# Patient Record
Sex: FEMALE | Race: OTHER
[De-identification: names, ages, dates, MRNs, and addresses within clinical notes are randomized per-mention and may not be internally consistent; named-entity substitution may affect disease eponyms.]

---

## 2017-03-09 ENCOUNTER — HOSPITAL ENCOUNTER (OUTPATIENT)
Dept: HOSPITAL 47 - RADUSWWP | Age: 27
Discharge: HOME | End: 2017-03-09
Payer: COMMERCIAL

## 2017-03-09 DIAGNOSIS — Z36: Primary | ICD-10-CM

## 2017-03-09 DIAGNOSIS — Z3A.14: ICD-10-CM

## 2017-03-09 LAB
CH: 32.5
CHCM: 35.5
ERYTHROCYTE [DISTWIDTH] IN BLOOD BY AUTOMATED COUNT: 4.06 M/UL (ref 3.8–5.4)
ERYTHROCYTE [DISTWIDTH] IN BLOOD: 12.7 % (ref 11.5–15.5)
GLUCOSE SERPL-MCNC: 79 MG/DL (ref 74–99)
HCT VFR BLD AUTO: 37.3 % (ref 34–46)
HDW: 2.64
HEPATITIS B SURFACE AG INDEX: 0.08
HGB BLD-MCNC: 12.7 GM/DL (ref 11.4–16)
MCH RBC QN AUTO: 31.3 PG (ref 25–35)
MCHC RBC AUTO-ENTMCNC: 34 G/DL (ref 31–37)
MCV RBC AUTO: 92 FL (ref 80–100)
NON-AFRICAN AMERICAN GFR(MDRD): >60
WBC # BLD AUTO: 10.5 K/UL (ref 3.8–10.6)

## 2017-03-09 PROCEDURE — 86900 BLOOD TYPING SEROLOGIC ABO: CPT

## 2017-03-09 PROCEDURE — 82947 ASSAY GLUCOSE BLOOD QUANT: CPT

## 2017-03-09 PROCEDURE — 86901 BLOOD TYPING SEROLOGIC RH(D): CPT

## 2017-03-09 PROCEDURE — 86850 RBC ANTIBODY SCREEN: CPT

## 2017-03-09 PROCEDURE — 76801 OB US < 14 WKS SINGLE FETUS: CPT

## 2017-03-09 PROCEDURE — 86762 RUBELLA ANTIBODY: CPT

## 2017-03-09 PROCEDURE — 86780 TREPONEMA PALLIDUM: CPT

## 2017-03-09 PROCEDURE — 87340 HEPATITIS B SURFACE AG IA: CPT

## 2017-03-09 PROCEDURE — 36415 COLL VENOUS BLD VENIPUNCTURE: CPT

## 2017-03-09 PROCEDURE — 82565 ASSAY OF CREATININE: CPT

## 2017-03-09 PROCEDURE — 85027 COMPLETE CBC AUTOMATED: CPT

## 2017-03-09 NOTE — US
EXAMINATION TYPE: US OB <= 14 wk fetus early second trimester

 

DATE OF EXAM: 3/9/2017 2:30 PM

 

COMPARISON: NONE

 

CLINICAL HISTORY: Z36 Confirm Dates. unknown dates

 

EXAM PERFORMED:  Transabdominal (TA)

 

EXAM MEASUREMENTS:

 

GESTATIONAL AGE / DATING

 

Physician Established: not established

 

Dates by LMP:  (15 weeks/3 days)

** EDC: 08/28/2017

Dates by First Scan:  no prior

Dates by Current Scan for:  (14 weeks/2 days)

** EDC: 09/05/2017

 

MATERNAL ANATOMY 

 

Uterus: 13.0 x 7.9 x 8.8 cm

Right Ovary: 2.6 x 1.4 x 1.9 cm

Left Ovary: 1.8 x 1.6 x 1.6 cm

Post CDS / Adnexa: wnl

Presence of free fluid: no

Presence of corpus luteal cyst: no

Presence of subchorionic bleed: no

BPD:2.7 cm??? 

**14  weeks /5days

FL:1.6 cm???? 

**  14weeks/4days 

 

GESTATION / FETAL SURVEY

 

CRL: 7.6 (13 weeks/5 days)

 

Heart Rate: 157 bpm

Rhythm:  Normal

IUP:  Viable IUP

 

 

Date of LMP: 11/21/2016

 

 

TECHNOLOGIST IMPRESSION:  viable IUP,1st scan for EDC 

 

Single live intrauterine gestation is confirmed as gestational sac and fetal pole are identified. Yol
k sac is not clearly evident. There is no free fluid seen in pelvic cul-de-sac.

 

Both ovaries are seen. No suspicious extraovarian adnexal masses are noted.

 

IMPRESSION:

Single live intrauterine gestation is confirmed, mean crown-rump length is 7.6 cm corresponding to 13
 weeks 5 day old fetus.

## 2017-04-13 ENCOUNTER — HOSPITAL ENCOUNTER (OUTPATIENT)
Dept: HOSPITAL 47 - RADUSWWP | Age: 27
Discharge: HOME | End: 2017-04-13
Payer: COMMERCIAL

## 2017-04-13 DIAGNOSIS — O36.62X0: Primary | ICD-10-CM

## 2017-04-13 DIAGNOSIS — Z3A.19: ICD-10-CM

## 2017-04-13 PROCEDURE — 76811 OB US DETAILED SNGL FETUS: CPT

## 2017-04-14 NOTE — US
EXAMINATION TYPE: US OB fetal anatomy transabd

 

DATE OF EXAM: 4/13/2017 5:03 PM

 

COMPARISON: Previous study dated 3/9/2017.

 

HISTORY: O36.62X0 Large for dates second trimester lga

 

TECHNIQUE:  Transabdominal (TA)

 

EXAM MEASUREMENTS:

 

GESTATIONAL AGE / DATING

Physician Established: (19 weeks/3 days)

** EDC:  9/5/2017

Dates by LMP:  (20 weeks/3 days)

** EDC: 8/28/2017

Dates by First Scan:  (19 weeks/3 days)

** EDC: 9/5/2017

Dates by Current Scan for:  (19 weeks/6 days)

** EDC:8/31/2017

 

FETAL SURVEY

IUP:  Single

PLACENTA: Anterior     

PREVIA: No previa   

** JENIFFER: 15.3 cm Normal

CERVICAL LENGTH (transabdominal: norm > 3.0cm): 3.7 cm

 

FETAL BIOMETRY

PRESENTATION:   Vertex

FETAL LIE:  Oblique

BPD: 4.7 cm

**  20 weeks / 2 days

HC: 17.1 cm

**  19 weeks / 5 days

AC: 14.5 cm

**  19 weeks / 6 days

FL: 3.1 cm

**  19 weeks / 5 days

ESTIMATED FETAL WEIGHT IN GRAMS:  310 grams

ESTIMATED FETAL WEIGHT IN LBS/OZS:  0 lbs. 11 oz. 

WEIGHT PERCENTAGE BASED ON ESTABLISHED DATE:  14 %

** HC/AC:  1.18  Normal 

** FL/AC:  21 Normal

HEART RATE:  139 bpm 

RHYTHM: Normal

 

ANATOMY SEEN (within normal limits): 

* Cisterna Magna (< 1.1 cm) 0.5 cm

* Nuchal Fold (< 0.6 cm) 0.2 cm

* Cerebellum (varies with age) 1.9 cm

Midline Falx 

Cavus Septi Pellucidi   

Four Chamber Heart

Stomach

Diaphragm 

Kidneys (bilateral) 

Bladder

Cord Insert 

Three Vessel Cord 

Longitudinal Spine 

Transverse Spine 

Legs (bilateral)

 

 

ANATOMY NOT SEEN:  

Choroid Plexus (bilateral)

Lateral Vent (< 1 cm)

Outflow tracts:  LVOT/RVOT

Situs

Nose / Lips 

Arms (bilateral)

 

IMPRESSION:  

 

ARREGUIN FETUS PRESENT IN A VERTEX LIE WITH A GESTATIONAL AGE OF 19 WEEKS 6 DAYS +/- 10 DAYS. ESTIMA
SERG DATE OF CONFINEMENT BASED ON THIS EXAMINATION IS 8/31/2017.

 

PLEASE NOTE THAT THE FETAL ANATOMIC EXAM WAS LIMITED.

## 2017-05-24 ENCOUNTER — HOSPITAL ENCOUNTER (OUTPATIENT)
Dept: HOSPITAL 47 - RADUSWWP | Age: 27
Discharge: HOME | End: 2017-05-24
Payer: COMMERCIAL

## 2017-05-24 DIAGNOSIS — K80.20: Primary | ICD-10-CM

## 2017-05-24 LAB
CH: 33.6
CHCM: 34.3
ERYTHROCYTE [DISTWIDTH] IN BLOOD BY AUTOMATED COUNT: 3.36 M/UL (ref 3.8–5.4)
ERYTHROCYTE [DISTWIDTH] IN BLOOD: 13.4 % (ref 11.5–15.5)
HCT VFR BLD AUTO: 33.1 % (ref 34–46)
HDW: 2.7
HGB BLD-MCNC: 11.3 GM/DL (ref 11.4–16)
MCH RBC QN AUTO: 33.5 PG (ref 25–35)
MCHC RBC AUTO-ENTMCNC: 34.1 G/DL (ref 31–37)
MCV RBC AUTO: 98.4 FL (ref 80–100)
WBC # BLD AUTO: 10.1 K/UL (ref 3.8–10.6)

## 2017-05-24 PROCEDURE — 85027 COMPLETE CBC AUTOMATED: CPT

## 2017-05-24 PROCEDURE — 82950 GLUCOSE TEST: CPT

## 2017-05-24 PROCEDURE — 76705 ECHO EXAM OF ABDOMEN: CPT

## 2017-05-24 NOTE — US
EXAMINATION TYPE: US gallbladder

 

DATE OF EXAM: 5/24/2017 8:36 AM

 

COMPARISON: None.

 

CLINICAL HISTORY: RUQ Abd Pain R10.11. Epigastric pain radiating to back

 

EXAM MEASUREMENTS:

 

Liver Length:  13.8 cm   

Gallbladder Wall:  0.1 cm   

CHD:  0.4 cm

Right Kidney:  11.7 x 4.8 x 4.3 cm

 

Pancreas:  echogenic

Liver:  wnl  

Gallbladder:  multiple mobile echogenic foci

**Evidence for sonographic De León's sign:  neg

CHD:  wnl 

Right Kidney:  wnl 

 

Limited views of the pancreas demonstrate echogenicity of the pancreas. This may represent fatty infi
ltration.

 

The liver is normal in size without biliary dilatation.

 

There is evidence of cholelithiasis. Gallbladder wall measures 1 mm. Distal common hepatic duct measu
res 4 mm.

 

The right kidney is normal.

 

The intrahepatic IVC is unremarkable.

 

IMPRESSION: 

 

CHOLELITHIASIS.

## 2017-06-07 ENCOUNTER — HOSPITAL ENCOUNTER (OUTPATIENT)
Dept: HOSPITAL 47 - LABWHC1 | Age: 27
Discharge: HOME | End: 2017-06-07
Payer: COMMERCIAL

## 2017-06-07 DIAGNOSIS — O24.419: Primary | ICD-10-CM

## 2017-06-07 DIAGNOSIS — Z3A.00: ICD-10-CM

## 2017-06-07 LAB
GLUCOSE 3H P GLC SERPL-MCNC: 136 MG/DL
GTT SERPL-IMP: (no result)

## 2017-06-07 PROCEDURE — 82952 GTT-ADDED SAMPLES: CPT

## 2017-06-07 PROCEDURE — 82951 GLUCOSE TOLERANCE TEST (GTT): CPT

## 2017-06-07 PROCEDURE — 36415 COLL VENOUS BLD VENIPUNCTURE: CPT

## 2017-09-06 ENCOUNTER — HOSPITAL ENCOUNTER (INPATIENT)
Dept: HOSPITAL 47 - FBPOP | Age: 27
LOS: 1 days | Discharge: HOME | End: 2017-09-07
Payer: COMMERCIAL

## 2017-09-06 VITALS — BODY MASS INDEX: 25.2 KG/M2

## 2017-09-06 DIAGNOSIS — O48.0: Primary | ICD-10-CM

## 2017-09-06 DIAGNOSIS — Z3A.40: ICD-10-CM

## 2017-09-06 LAB
BASOPHILS # BLD AUTO: 0 K/UL (ref 0–0.2)
BASOPHILS NFR BLD AUTO: 0 %
CH: 33.2
CHCM: 35.6
EOSINOPHIL # BLD AUTO: 0 K/UL (ref 0–0.7)
EOSINOPHIL NFR BLD AUTO: 0 %
ERYTHROCYTE [DISTWIDTH] IN BLOOD BY AUTOMATED COUNT: 3.96 M/UL (ref 3.8–5.4)
ERYTHROCYTE [DISTWIDTH] IN BLOOD: 13.3 % (ref 11.5–15.5)
HCT VFR BLD AUTO: 37.1 % (ref 34–46)
HDW: 2.47
HGB BLD-MCNC: 13.1 GM/DL (ref 11.4–16)
LUC NFR BLD AUTO: 2 %
LYMPHOCYTES # SPEC AUTO: 1.4 K/UL (ref 1–4.8)
LYMPHOCYTES NFR SPEC AUTO: 14 %
MCH RBC QN AUTO: 33.2 PG (ref 25–35)
MCHC RBC AUTO-ENTMCNC: 35.4 G/DL (ref 31–37)
MCV RBC AUTO: 93.7 FL (ref 80–100)
MONOCYTES # BLD AUTO: 0.5 K/UL (ref 0–1)
MONOCYTES NFR BLD AUTO: 5 %
NEUTROPHILS # BLD AUTO: 7.9 K/UL (ref 1.3–7.7)
NEUTROPHILS NFR BLD AUTO: 79 %
WBC # BLD AUTO: 0.17 10*3/UL
WBC # BLD AUTO: 9.9 K/UL (ref 3.8–10.6)
WBC (PEROX): 9.92

## 2017-09-06 PROCEDURE — 88307 TISSUE EXAM BY PATHOLOGIST: CPT

## 2017-09-06 PROCEDURE — 10907ZC DRAINAGE OF AMNIOTIC FLUID, THERAPEUTIC FROM PRODUCTS OF CONCEPTION, VIA NATURAL OR ARTIFICIAL OPENING: ICD-10-PCS

## 2017-09-06 PROCEDURE — 99213 OFFICE O/P EST LOW 20 MIN: CPT

## 2017-09-06 PROCEDURE — 0KQM0ZZ REPAIR PERINEUM MUSCLE, OPEN APPROACH: ICD-10-PCS

## 2017-09-06 PROCEDURE — 85025 COMPLETE CBC W/AUTO DIFF WBC: CPT

## 2017-09-06 PROCEDURE — 59025 FETAL NON-STRESS TEST: CPT

## 2017-09-06 RX ADMIN — DOCUSATE SODIUM AND SENNOSIDES SCH: 50; 8.6 TABLET ORAL at 21:21

## 2017-09-06 NOTE — P.MSEPDOC
Presenting Problems





- Arrival Data


Date of Arrival on Unit: 17


Time of Arrival on Unit: 08:34


Mode of Transport: Ambulatory





- Complaint


OB-Reason for Admission/Chief Complaint: Possible Onset of Labor





Prenatal Medical History





- Pregnancy Information


: 1


Para: 0


Term: 0


: 0


Abortions: Spontaneous or Elective: 0


Number of Living Children: 0





- Gestational Age


Expected Date of Delivery: 17


Gestational Age by THU (wks/days): 39 Weeks and 6 Days





Review of Systems





- Review of Systems


Constitutional: No problems


Breast: No problems


ENT: No problems


Cardiovascular: No problems


Respiratory: No problems


Gastrointestinal: No problems


Genitourinary: No problems


Musculoskeletal: No problems


Neurological: No problems


Skin: No problems





Vital Signs





- Temperature


Temperature: 96.6 F


Temperature Source: Temporal Artery Scan





- Pulse


  ** Right Sitting Brachial


Pulse Rate: 75


Pulse Assessment Method: Palpation





- Respirations


Respiratory Rate: 20


Oxygen Delivery Method: Room Air





- Blood Pressure


  ** Right Arm Supine


Blood Pressure: 135/86


Blood Pressure Mean: 102


Blood Pressure Source: Automatic Cuff





Medical Screen Scoring (Pre)





- Cervical Exam


Dilation: 1-3 cm = 1


Effacement: More than 50% = 2


Membranes: Intact





- Uterine Contractions


Frequency: > 5 minutes apart = 1





- Maternal Vital Signs


Maternal Temperature: N/A


Maternal Blood Pressure: N/A


Signs of Preeclampsia: N/A


Maternal Respirations: N/A





- Maternal Trauma


Maternal Trauma: N/A





- Fetal Assessment


Baseline FHR: 125


Fetal Heart Rate - NICHD Category: Category I (Normal) = 0


NST: Reactive





- Total Score


Total Score (Pre): 4





- Level of Risk


Level of Risk: Low (0-5)





Physician Notification (Pre)





- Physician Notified


Physician Notified Date: 17


Physician Notified Time: 09:15


Physician/Practitioner Notifed:: Yeison


Spoke With: Yeison


New Order Received: Yes





- Notification Comment


Comment: Admit for labor





Disposition





- Disposition


OB Disposition: Admit


Transferred to:: [address]


I agree with the RN Medical Screening Exam: Yes


Risk & Benefit of care provided described in d/c instruction: Yes


Diagnosis: ENCOUNTER FOR FULL-TERM UNCOMPLICATED DELIVERY

## 2017-09-06 NOTE — P.HPOB
History of Present Illness


H&P Date: 17


Chief Complaint: normal labor





27 year old  presents at 40 weeks 1 day in labor. HEr cervix is 3-4/90/-1 

and she is roz every 2-7 minutes. fetal heart tones 135-140 with 

moderate variability and reactive. 





Review of Systems


All systems: negative


Constitutional: Denies chills, Denies fever


Eyes: denies blurred vision, denies pain


Ears, nose, mouth and throat: Denies headache, Denies sore throat


Cardiovascular: Denies chest pain, Denies shortness of breath


Respiratory: Denies cough


Gastrointestinal: Denies abdominal pain, Denies diarrhea, Denies nausea, Denies 

vomiting


Genitourinary: Denies dysuria, Denies hematuria


Musculoskeletal: Denies myalgias


Integumentary: Denies pruritus, Denies rash


Neurological: Denies numbness, Denies weakness


Psychiatric: Denies anxiety, Denies depression


Endocrine: Denies fatigue, Denies weight change





Past Medical History


Past Medical History: No Reported History


Additional Past Medical History / Comment(s): Obstetric history: This is her 

first pregnancy and she had prenatal care with me since 13 weeks. A+, abs neg, 

Rub Imm, Treponemal ab neg, Hep B neg. abnormal 1hr, normal 3hr.GBS neg.


History of Any Multi-Drug Resistant Organisms: None Reported


Past Surgical History: No Surgical Hx Reported


Past Anesthesia/Blood Transfusion Reactions: No Reported Reaction


Smoking Status: Never smoker


Past Alcohol Use History: None Reported


Past Drug Use History: None Reported





- Past Family History


  ** Mother


Family Medical History: No Reported History





Medications and Allergies


 Home Medications











 Medication  Instructions  Recorded  Confirmed  Type


 


Pnv,Calcium 72/Iron/Folic Acid 1 tab PO DAILY 17 History





[Prenatal Plus Tablet]    











 Allergies











Allergy/AdvReac Type Severity Reaction Status Date / Time


 


No Known Allergies Allergy   Verified 17 08:40














Exam


Osteopathic Statement: *.  No significant issues noted on an osteopathic 

structural exam other than those noted in the History and Physical/Consult.





- Vital Signs


Vital signs: 


 Vital Signs











  Temp Pulse Resp BP


 


 17 09:42  96.6 F L  75  20  135/86








 Intake and Output











 17





 22:59 06:59 14:59


 


Other:   


 


  Weight   68.946 kg








 Patient Weight











 17





 06:59


 


Weight 68.946 kg














Heart: RRR


Lungs: CTAB


Abdomen: soft, nontender


Extremeties: neg du's





Results


Result Diagrams: 


 17 09:40





 Abnormal Lab Results - Last 24 Hours (Table)











  17 Range/Units





  09:40 


 


Neutrophils #  7.9 H  (1.3-7.7)  k/uL














Assessment and Plan


(1) Normal labor


Status: Acute   


Plan: 





1. admit to family birth


2. anticipate normal vaginal delivery

## 2017-09-07 VITALS
TEMPERATURE: 98.1 F | HEART RATE: 87 BPM | SYSTOLIC BLOOD PRESSURE: 114 MMHG | DIASTOLIC BLOOD PRESSURE: 62 MMHG | RESPIRATION RATE: 16 BRPM

## 2017-09-07 RX ADMIN — DOCUSATE SODIUM AND SENNOSIDES SCH EACH: 50; 8.6 TABLET ORAL at 08:23

## 2017-09-07 NOTE — P.PROBDLV
Vaginal Delivery Note





- .


Vaginal Delivery Note: 





27-year-old  presents at 40 weeks and 1 day in active labor.  Her cervix 

is 3-4 centers dilated, 90% effaced, -2 station.  She is roz every 2-7 

minutes.  Fetal heart tones are 140-145 with moderate variability and reactive.

  Pitocin augmentation was started.  Amniotomy performed at 1207, clear fluid 

noted.  Her cervix was completely dilated at 1329.  She pushed, delivered a 

viable female infant over intact perineum at 1342.  Head delivered OA, anterior 

shoulder delivered gentle downward traction followed by posterior shoulder and 

rest of body.  Nose and mouth bulb suctioned, cord clamped and cut, infant 

placed on mother's abdomen.  Apgars 9, 9, weight 7 lbs. 4 oz.  Placenta 

delivered spontaneously, intact with three-vessel cord at 1345.  Vagina, cervix

, perineum inspected.  Second-degree midline laceration repaired with 3-0 

Vicryl.  Estimated blood loss 250 mL.  Mother and baby in stable condition.

## 2017-09-07 NOTE — P.DS
Providers


Date of admission: 


09/06/17 09:30





Expected date of discharge: 09/07/17


Attending physician: 


Latrice Latham








- Discharge Diagnosis(es)


(1) Normal labor


Current Visit: Yes   Status: Resolved   





(2) Normal vaginal delivery


Current Visit: Yes   Status: Acute   


Hospital Course: 





Patient presented and in active labor.  She underwent a normal vaginal 

delivery.  She had an uncomplicated postpartum course.  She'll be discharged 

home postpartum day #1 in stable condition to follow-up with me in 6 weeks.





Plan - Discharge Summary


New Discharge Prescriptions: 


New


   Ibuprofen [Motrin] 600 mg PO Q6HR PRN #30 tab


     PRN Reason: Mild Pain Or Fever >= 100.5





No Action


   Pnv,Calcium 72/Iron/Folic Acid [Prenatal Plus Tablet] 1 tab PO DAILY


Discharge Medication List





Pnv,Calcium 72/Iron/Folic Acid [Prenatal Plus Tablet] 1 tab PO DAILY 09/06/17 [

History]


Ibuprofen [Motrin] 600 mg PO Q6HR PRN #30 tab 09/07/17 [Rx]








Follow up Appointment(s)/Referral(s): 


Latrice Latham DO [Doctor of Osteopathic Medicine] - 6 Weeks


Discharge Disposition: HOME SELF-CARE

## 2017-09-24 ENCOUNTER — HOSPITAL ENCOUNTER (INPATIENT)
Dept: HOSPITAL 47 - EC | Age: 27
LOS: 4 days | Discharge: HOME | DRG: 769 | End: 2017-09-28
Payer: COMMERCIAL

## 2017-09-24 VITALS — BODY MASS INDEX: 21.4 KG/M2

## 2017-09-24 DIAGNOSIS — Z82.49: ICD-10-CM

## 2017-09-24 DIAGNOSIS — K85.10: ICD-10-CM

## 2017-09-24 DIAGNOSIS — Z83.3: ICD-10-CM

## 2017-09-24 LAB
ALP SERPL-CCNC: 108 U/L (ref 38–126)
ALT SERPL-CCNC: 77 U/L (ref 9–52)
AMYLASE SERPL-CCNC: 155 U/L (ref 30–110)
ANION GAP SERPL CALC-SCNC: 11 MMOL/L
AST SERPL-CCNC: 116 U/L (ref 14–36)
BASOPHILS # BLD AUTO: 0 K/UL (ref 0–0.2)
BASOPHILS NFR BLD AUTO: 0 %
BUN SERPL-SCNC: 9 MG/DL (ref 7–17)
CALCIUM SPEC-MCNC: 9.4 MG/DL (ref 8.4–10.2)
CH: 34.1
CHCM: 35.3
CHLORIDE SERPL-SCNC: 106 MMOL/L (ref 98–107)
CO2 SERPL-SCNC: 25 MMOL/L (ref 22–30)
EOSINOPHIL # BLD AUTO: 0.1 K/UL (ref 0–0.7)
EOSINOPHIL NFR BLD AUTO: 1 %
ERYTHROCYTE [DISTWIDTH] IN BLOOD BY AUTOMATED COUNT: 3.95 M/UL (ref 3.8–5.4)
ERYTHROCYTE [DISTWIDTH] IN BLOOD: 14.1 % (ref 11.5–15.5)
GLUCOSE SERPL-MCNC: 98 MG/DL (ref 74–99)
HCT VFR BLD AUTO: 38.4 % (ref 34–46)
HDW: 2.98
HGB BLD-MCNC: 12.8 GM/DL (ref 11.4–16)
LUC NFR BLD AUTO: 1 %
LYMPHOCYTES # SPEC AUTO: 0.9 K/UL (ref 1–4.8)
LYMPHOCYTES NFR SPEC AUTO: 9 %
MCH RBC QN AUTO: 32.5 PG (ref 25–35)
MCHC RBC AUTO-ENTMCNC: 33.4 G/DL (ref 31–37)
MCV RBC AUTO: 97.2 FL (ref 80–100)
MONOCYTES # BLD AUTO: 0.3 K/UL (ref 0–1)
MONOCYTES NFR BLD AUTO: 4 %
NEUTROPHILS # BLD AUTO: 8.1 K/UL (ref 1.3–7.7)
NEUTROPHILS NFR BLD AUTO: 86 %
NON-AFRICAN AMERICAN GFR(MDRD): >60
PARTICLE COUNT: 2859
PH UR: 6 [PH] (ref 5–8)
POTASSIUM SERPL-SCNC: 3.9 MMOL/L (ref 3.5–5.1)
PROT SERPL-MCNC: 7.1 G/DL (ref 6.3–8.2)
RBC UR QL: 8 /HPF (ref 0–5)
SODIUM SERPL-SCNC: 142 MMOL/L (ref 137–145)
SP GR UR: 1.02 (ref 1–1.03)
SQUAMOUS UR QL AUTO: <1 /HPF (ref 0–4)
UA BILLING (MACRO VS. MICRO): (no result)
UROBILINOGEN UR QL STRIP: 3 MG/DL (ref ?–2)
WBC # BLD AUTO: 0.08 10*3/UL
WBC # BLD AUTO: 9.5 K/UL (ref 3.8–10.6)
WBC #/AREA URNS HPF: 23 /HPF (ref 0–5)
WBC (PEROX): 10

## 2017-09-24 PROCEDURE — 96360 HYDRATION IV INFUSION INIT: CPT

## 2017-09-24 PROCEDURE — 81001 URINALYSIS AUTO W/SCOPE: CPT

## 2017-09-24 PROCEDURE — 74000: CPT

## 2017-09-24 PROCEDURE — 88304 TISSUE EXAM BY PATHOLOGIST: CPT

## 2017-09-24 PROCEDURE — 80053 COMPREHEN METABOLIC PANEL: CPT

## 2017-09-24 PROCEDURE — 76705 ECHO EXAM OF ABDOMEN: CPT

## 2017-09-24 PROCEDURE — 81025 URINE PREGNANCY TEST: CPT

## 2017-09-24 PROCEDURE — 87040 BLOOD CULTURE FOR BACTERIA: CPT

## 2017-09-24 PROCEDURE — 83690 ASSAY OF LIPASE: CPT

## 2017-09-24 PROCEDURE — 87086 URINE CULTURE/COLONY COUNT: CPT

## 2017-09-24 PROCEDURE — 82150 ASSAY OF AMYLASE: CPT

## 2017-09-24 PROCEDURE — 85025 COMPLETE CBC W/AUTO DIFF WBC: CPT

## 2017-09-24 PROCEDURE — 80061 LIPID PANEL: CPT

## 2017-09-24 PROCEDURE — 36415 COLL VENOUS BLD VENIPUNCTURE: CPT

## 2017-09-24 PROCEDURE — 99285 EMERGENCY DEPT VISIT HI MDM: CPT

## 2017-09-24 NOTE — US
EXAMINATION TYPE: US abdomen limited

 

DATE OF EXAM: 9/24/2017

 

COMPARISON: US

 

CLINICAL HISTORY: Pain.

 

EXAM MEASUREMENTS:

 

Liver Length:  12.8 cm   

Gallbladder Wall:  0.3 cm   

CBD:  0.4 cm

Right Kidney:  12.2 x 4.8 x 4.0 cm

 

 

 

Pancreas:  partially obscured by bowel gas

Liver:  wnl  

Gallbladder:cholethiasis

**Evidence for sonographic De León's sign:  No

CBD:  wnl 

Right Kidney:  wnl 

 

 

 

IMPRESSION: Numerous gallstones. No dilated ducts. No focal liver defect.

## 2017-09-24 NOTE — XR
EXAMINATION TYPE: XR KUB

 

DATE OF EXAM: 9/24/2017

 

COMPARISON: NONE

 

HISTORY: Abdominal pain

 

TECHNIQUE: 2 views

 

FINDINGS: There is no sign of intestinal obstruction or pneumoperitoneum. Fecal pattern is normal. Th
ere are no pathologic calcifications. Lung bases are clear. There is no sign of a mass. Bony structur
es appear normal.

 

IMPRESSION: Nonacute abdomen.

## 2017-09-24 NOTE — ED
Abdominal Pain HPI





<Ray Clifton - Last Filed: 09/24/17 18:48>





- General


Source: patient, RN notes reviewed


Mode of arrival: ambulatory


Limitations: no limitations





<Edd Cummings - Last Filed: 09/24/17 18:51>





- General


Chief Complaint: Abdominal Pain


Stated Complaint: Abd Pain


Time Seen by Provider: 09/24/17 15:44





- History of Present Illness


Initial Comments: 





this a 27-year-old female presents emergency Department with chief complaint of 

abdominal pain.  Patient states the pain sstarted this morning.  Patient states 

she has right upper to mid abdominal pain states radiates to her shoulder.  

Patient states she's been diagnosed with gallstones in the past.  She states 

that she is 2 weeks postpartum.  Denies any increased vaginal bleeding or 

vaginal discharge.  Denies any dysuria or hematuria.  She states that she has 

some nausea no vomiting.  She did have heartburn throughout her pregnancy but 

states that has improved since delivering. (Edd Cummings)





- Related Data


 Home Medications











 Medication  Instructions  Recorded  Confirmed


 


Prenatal Vit-Iron-Folic Acid 1 cap PO HS 09/24/17 09/24/17





[Prenatal-U Capsule (formulary)]   











 Allergies











Allergy/AdvReac Type Severity Reaction Status Date / Time


 


No Known Allergies Allergy   Verified 09/24/17 16:07














Review of Systems


ROS Other: All systems not noted in ROS Statement are negative.





<Ray Clifton - Last Filed: 09/24/17 18:48>


ROS Other: All systems not noted in ROS Statement are negative.





<Edd Cummings - Last Filed: 09/24/17 18:51>


ROS Statement: 


Those systems with pertinent positive or pertinent negative responses have been 

documented in the HPI.








Past Medical History


Past Medical History: No Reported History


Additional Past Medical History / Comment(s): Obstetric history: This is her 

first pregnancy and she had prenatal care with me since 13 weeks. A+, abs neg, 

Rub Imm, Treponemal ab neg, Hep B neg. abnormal 1hr, normal 3hr.GBS neg.


History of Any Multi-Drug Resistant Organisms: None Reported


Past Surgical History: No Surgical Hx Reported


Past Anesthesia/Blood Transfusion Reactions: No Reported Reaction


Past Psychological History: No Psychological Hx Reported


Smoking Status: Never smoker


Past Alcohol Use History: None Reported


Past Drug Use History: None Reported





- Past Family History


  ** Mother


Family Medical History: No Reported History





<Edd Cummings - Last Filed: 09/24/17 18:51>





General Exam


Limitations: no limitations


General appearance: alert, in no apparent distress


Head exam: Present: atraumatic, normocephalic, normal inspection


Respiratory exam: Present: normal lung sounds bilaterally.  Absent: respiratory 

distress, wheezes, rales, rhonchi, stridor


Cardiovascular Exam: Present: regular rate, normal rhythm, normal heart sounds.

  Absent: systolic murmur, diastolic murmur, rubs, gallop, clicks


GI/Abdominal exam: Present: soft, tenderness (mild epigastric, right upper 

quadrant tenderness), normal bowel sounds.  Absent: distended, guarding, rebound

, rigid


Back exam: Absent: CVA tenderness (R), CVA tenderness (L)


Neurological exam: Present: alert, oriented X3, CN II-XII intact


Skin exam: Present: warm, dry, intact, normal color.  Absent: rash





<Edd Cummings ROSY - Last Filed: 09/24/17 18:51>





Medical Decision Making





- Lab Data


Result diagrams: 


 09/24/17 16:09





 09/24/17 16:09





<Ray Clifton - Last Filed: 09/24/17 18:48>





- Lab Data


Result diagrams: 


 09/24/17 16:09





 09/24/17 16:09





<EmilianoEdd ROSY - Last Filed: 09/24/17 18:51>





- Medical Decision Making





Case discussed with Dr. Arceo, who will admit for Dr. Payton.  Consult for 

Dr. Pathak. (Ray Clifton)





- Lab Data


 Lab Results











  09/24/17 09/24/17 09/24/17 Range/Units





  16:09 16:09 16:09 


 


WBC   9.5   (3.8-10.6)  k/uL


 


RBC   3.95   (3.80-5.40)  m/uL


 


Hgb   12.8   (11.4-16.0)  gm/dL


 


Hct   38.4   (34.0-46.0)  %


 


MCV   97.2   (80.0-100.0)  fL


 


MCH   32.5   (25.0-35.0)  pg


 


MCHC   33.4   (31.0-37.0)  g/dL


 


RDW   14.1   (11.5-15.5)  %


 


Plt Count   275   (150-450)  k/uL


 


Neutrophils %   86   %


 


Lymphocytes %   9   %


 


Monocytes %   4   %


 


Eosinophils %   1   %


 


Basophils %   0   %


 


Neutrophils #   8.1 H   (1.3-7.7)  k/uL


 


Lymphocytes #   0.9 L   (1.0-4.8)  k/uL


 


Monocytes #   0.3   (0-1.0)  k/uL


 


Eosinophils #   0.1   (0-0.7)  k/uL


 


Basophils #   0.0   (0-0.2)  k/uL


 


Sodium  142    (137-145)  mmol/L


 


Potassium  3.9    (3.5-5.1)  mmol/L


 


Chloride  106    ()  mmol/L


 


Carbon Dioxide  25    (22-30)  mmol/L


 


Anion Gap  11    mmol/L


 


BUN  9    (7-17)  mg/dL


 


Creatinine  0.70    (0.52-1.04)  mg/dL


 


Est GFR (MDRD) Af Amer  >60    (>60 ml/min/1.73 sqM)  


 


Est GFR (MDRD) Non-Af  >60    (>60 ml/min/1.73 sqM)  


 


Glucose  98    (74-99)  mg/dL


 


Calcium  9.4    (8.4-10.2)  mg/dL


 


Total Bilirubin  0.7    (0.2-1.3)  mg/dL


 


AST  116 H    (14-36)  U/L


 


ALT  77 H    (9-52)  U/L


 


Alkaline Phosphatase  108    ()  U/L


 


Total Protein  7.1    (6.3-8.2)  g/dL


 


Albumin  4.2    (3.5-5.0)  g/dL


 


Amylase  155 H    ()  U/L


 


Lipase  866 H    ()  U/L


 


Urine Color    Yellow  


 


Urine Appearance    Clear  (Clear)  


 


Urine pH    6.0  (5.0-8.0)  


 


Ur Specific Gravity    1.021  (1.001-1.035)  


 


Urine Protein    Trace H  (Negative)  


 


Urine Glucose (UA)    Negative  (Negative)  


 


Urine Ketones    Negative  (Negative)  


 


Urine Blood    Small H  (Negative)  


 


Urine Nitrite    Negative  (Negative)  


 


Urine Bilirubin    Negative  (Negative)  


 


Urine Urobilinogen    3.0  (<2.0)  mg/dL


 


Ur Leukocyte Esterase    Large H  (Negative)  


 


Urine RBC    8 H  (0-5)  /hpf


 


Urine WBC    23 H  (0-5)  /hpf


 


Ur Squamous Epith Cells    <1  (0-4)  /hpf


 


Urine Mucus    Rare H  (None)  /hpf














Disposition





<Ray Clifton - Last Filed: 09/24/17 18:48>





<Edd Cummings - Last Filed: 09/24/17 18:51>


Clinical Impression: 


 UTI (urinary tract infection), Choledocholithiasis, Pancreatitis





Disposition: ADMITTED AS IP TO THIS HOSP


Condition: Fair


Referrals: 


Alexander Mejía DO [Primary Care Provider] - 1-2 days

## 2017-09-25 LAB
CHOLEST SERPL-MCNC: 176 MG/DL (ref ?–200)
HDLC SERPL-MCNC: 46 MG/DL (ref 40–60)

## 2017-09-25 RX ADMIN — DEXTROSE MONOHYDRATE SCH MLS/HR: 5 INJECTION, SOLUTION INTRAVENOUS at 00:49

## 2017-09-25 RX ADMIN — PANTOPRAZOLE SODIUM SCH MG: 40 INJECTION, POWDER, FOR SOLUTION INTRAVENOUS at 15:13

## 2017-09-25 RX ADMIN — PRENATAL W/O A VIT W/ FE FUMARATE-FA CAP 106.5-1 MG SCH EACH: 106.5 CAPSULE ORAL at 22:20

## 2017-09-25 RX ADMIN — DEXTROSE MONOHYDRATE SCH MLS/HR: 5 INJECTION, SOLUTION INTRAVENOUS at 08:46

## 2017-09-25 RX ADMIN — PANTOPRAZOLE SODIUM SCH MG: 40 INJECTION, POWDER, FOR SOLUTION INTRAVENOUS at 22:20

## 2017-09-25 RX ADMIN — DEXTROSE MONOHYDRATE SCH MLS/HR: 5 INJECTION, SOLUTION INTRAVENOUS at 17:07

## 2017-09-25 RX ADMIN — CEFAZOLIN SCH MLS/HR: 330 INJECTION, POWDER, FOR SOLUTION INTRAMUSCULAR; INTRAVENOUS at 15:06

## 2017-09-25 RX ADMIN — CEFAZOLIN SCH MLS/HR: 330 INJECTION, POWDER, FOR SOLUTION INTRAMUSCULAR; INTRAVENOUS at 22:24

## 2017-09-25 NOTE — P.GSCN
History of Present Illness


Consult date: 09/25/17


Reason for Consult: 





Gallstone pancreatitis


History of present illness: 





Patient is admitted to the hospital with abdominal pain.  Pain was in the 

midepigastric region with radiation of back and shoulders.  She had a history 

of a similar event in May of this year.  She was told in the past that she has 

gallstones.  No nausea or vomiting.  Pain is since resolved.  Her ALT and AST 

are elevated in addition to her amylase and lipase.  These labs were not 

repeated today.  An ultrasound showed gallstones without biliary dilation.  No 

change in the color of her skin urine or stool.  She is hungry.  She just 

delivered a baby 2-3 weeks ago.  Denies rectal bleeding or melena.





Review of Systems





The patient denies any acute changes in vision or hearing, no dysphagia or 

odynophagia, no chest pain or shortness of breath, no dysuria or hematuria, no 

headache, no runny nose, no rectal bleeding or melena, no unexplained weight 

loss 





Past Medical History


Past Medical History: No Reported History


Additional Past Medical History / Comment(s): Obstetric history: This is her 

first pregnancy and she had prenatal care with me since 13 weeks. A+, abs neg, 

Rub Imm, Treponemal ab neg, Hep B neg. abnormal 1hr, normal 3hr.GBS neg.


History of Any Multi-Drug Resistant Organisms: None Reported


Past Surgical History: No Surgical Hx Reported


Past Anesthesia/Blood Transfusion Reactions: No Reported Reaction


Past Psychological History: No Psychological Hx Reported


Smoking Status: Never smoker


Past Alcohol Use History: None Reported


Past Drug Use History: None Reported





- Past Family History


  ** Mother


Family Medical History: No Reported History





Medications and Allergies


 Home Medications











 Medication  Instructions  Recorded  Confirmed  Type


 


Prenatal Vit-Iron-Folic Acid 1 cap PO HS 09/24/17 09/24/17 History





[Prenatal-U Capsule (formulary)]    











 Allergies











Allergy/AdvReac Type Severity Reaction Status Date / Time


 


No Known Allergies Allergy   Verified 09/24/17 21:07














Surgical - Exam


 Vital Signs











Temp Pulse Resp BP Pulse Ox


 


 97.8 F   73   18   131/75   99 


 


 09/24/17 15:38  09/24/17 15:38  09/24/17 15:38  09/24/17 15:38  09/24/17 15:38

















Physical exam:


General: Well-developed, well-nourished


HEENT: Normocephalic, sclerae nonicteric


Abdomen: Nontender, nondistended


Extremities: No edema


Neuro: Alert and oriented





Results





- Labs





 09/24/17 16:09





 09/24/17 16:09


 Abnormal Lab Results - Last 24 Hours (Table)











  09/24/17 09/24/17 09/24/17 Range/Units





  16:09 16:09 16:09 


 


Neutrophils #   8.1 H   (1.3-7.7)  k/uL


 


Lymphocytes #   0.9 L   (1.0-4.8)  k/uL


 


AST  116 H    (14-36)  U/L


 


ALT  77 H    (9-52)  U/L


 


Triglycerides     (<150)  mg/dL


 


Amylase  155 H    ()  U/L


 


Lipase  866 H    ()  U/L


 


Urine Protein    Trace H  (Negative)  


 


Urine Blood    Small H  (Negative)  


 


Ur Leukocyte Esterase    Large H  (Negative)  


 


Urine RBC    8 H  (0-5)  /hpf


 


Urine WBC    23 H  (0-5)  /hpf


 


Urine Mucus    Rare H  (None)  /hpf














  09/25/17 Range/Units





  06:53 


 


Neutrophils #   (1.3-7.7)  k/uL


 


Lymphocytes #   (1.0-4.8)  k/uL


 


AST   (14-36)  U/L


 


ALT   (9-52)  U/L


 


Triglycerides  170 H  (<150)  mg/dL


 


Amylase   ()  U/L


 


Lipase   ()  U/L


 


Urine Protein   (Negative)  


 


Urine Blood   (Negative)  


 


Ur Leukocyte Esterase   (Negative)  


 


Urine RBC   (0-5)  /hpf


 


Urine WBC   (0-5)  /hpf


 


Urine Mucus   (None)  /hpf








 Microbiology - Last 24 Hours (Table)











 09/24/17 16:09 Urine Culture - Preliminary





 Urine,Clean Catch 








 Diabetes panel











  09/24/17 09/25/17 Range/Units





  16:09 06:53 


 


Sodium  142   (137-145)  mmol/L


 


Potassium  3.9   (3.5-5.1)  mmol/L


 


Chloride  106   ()  mmol/L


 


Carbon Dioxide  25   (22-30)  mmol/L


 


BUN  9   (7-17)  mg/dL


 


Creatinine  0.70   (0.52-1.04)  mg/dL


 


Glucose  98   (74-99)  mg/dL


 


Calcium  9.4   (8.4-10.2)  mg/dL


 


AST  116 H   (14-36)  U/L


 


ALT  77 H   (9-52)  U/L


 


Alkaline Phosphatase  108   ()  U/L


 


Total Protein  7.1   (6.3-8.2)  g/dL


 


Albumin  4.2   (3.5-5.0)  g/dL


 


Triglycerides   170 H  (<150)  mg/dL


 


HDL Cholesterol   46  (40-60)  mg/dL








 Calcium panel











  09/24/17 Range/Units





  16:09 


 


Calcium  9.4  (8.4-10.2)  mg/dL


 


Albumin  4.2  (3.5-5.0)  g/dL








 Pituitary panel











  09/24/17 Range/Units





  16:09 


 


Sodium  142  (137-145)  mmol/L


 


Potassium  3.9  (3.5-5.1)  mmol/L


 


Chloride  106  ()  mmol/L


 


Carbon Dioxide  25  (22-30)  mmol/L


 


BUN  9  (7-17)  mg/dL


 


Creatinine  0.70  (0.52-1.04)  mg/dL


 


Glucose  98  (74-99)  mg/dL


 


Calcium  9.4  (8.4-10.2)  mg/dL








 Adrenal panel











  09/24/17 Range/Units





  16:09 


 


Sodium  142  (137-145)  mmol/L


 


Potassium  3.9  (3.5-5.1)  mmol/L


 


Chloride  106  ()  mmol/L


 


Carbon Dioxide  25  (22-30)  mmol/L


 


BUN  9  (7-17)  mg/dL


 


Creatinine  0.70  (0.52-1.04)  mg/dL


 


Glucose  98  (74-99)  mg/dL


 


Calcium  9.4  (8.4-10.2)  mg/dL


 


Total Bilirubin  0.7  (0.2-1.3)  mg/dL


 


AST  116 H  (14-36)  U/L


 


ALT  77 H  (9-52)  U/L


 


Alkaline Phosphatase  108  ()  U/L


 


Total Protein  7.1  (6.3-8.2)  g/dL


 


Albumin  4.2  (3.5-5.0)  g/dL














Assessment and Plan


Plan: 





Impression: Gallstone pancreatitis next





Plan: We'll repeat lab work tomorrow.  Begin diet at this time.  If the patient'

s labs are improved we'll consider laparoscopic cholecystectomy tomorrow.  

Continue empiric anabiotic.

## 2017-09-25 NOTE — P.HPIM
History of Present Illness


Chief Complaint: epigastric pain 





27 years old female postpartum 2 weeks presents with acute abdominal pain that 

started yesterday involving the right upper quadrant and the epigastric area.  

Patient said that she had similar presentation while she was pregnant but could 

not undergo any surgery due to the pregnancy.  She had similar symptoms 2 days 

ago but went away by itself.  She was diagnosed to have gallstones during her 

pregnancy.  Patient denies any nausea, vomiting, diarrhea or constipation.  

Denies any blood in stool.  No change in stool, had her last bowel movement 

yesterday stopped she does uses Prilosec over-the-counter which has helped with 

her abdominal pain and had an ibuprofen for headache on Friday but apart from 

that she is on no medication.  She denies any chest pain or shortness of breath 

but could not to 3 due to the epigastric pain she was having.  She denies any 

palpitation or lower extremity edema, patient denies any history of blood clots 

in the past





Review of Systems


Constitutional: Denies chills, Denies fever, Denies malaise, Denies night sweats

, Denies poor appetite


Eyes: denies blurred vision, denies bulging eye, denies discharge


Ears: deny: ear discharge


Ears, nose, mouth and throat: Denies bleeding gums, Denies headache, Denies 

nasal discharge


Cardiovascular: Reports as per HPI, Reports shortness of breath, Denies 

lightheadedness, Denies orthopnea, Denies rapid heart beat


Respiratory: Denies cough with sputum


Gastrointestinal: Reports abdominal pain, Denies belching, Denies bloating, 

Denies BRBPR, Denies change in bowel habits, Denies coffee ground emesis, 

Denies constipation, Denies diarrhea, Denies early satiety, Denies heartburn, 

Denies hematochezia, Denies jaundice, Denies nausea, Denies vomiting


Genitourinary: Denies flank pain, Denies kidney stones


Musculoskeletal: Denies neck pain, Denies neck stiffness


Integumentary: Denies dryness, Denies growths, Denies lesions


Neurological: Denies headaches, Denies lack of coordination, Denies loss of 

vision, Denies memory loss, Denies motor disturbance, Denies numbness, Denies 

seizures


Endocrine: Denies excessive sweating, Denies fatigue, Denies flushing, Denies 

heat intolerance, Denies high blood sugars





Past Medical History


Past Medical History: No Reported History (normal vaginal delivery 2 weeks ago )


Additional Past Medical History / Comment(s): Obstetric history: This is her 

first pregnancy and she had prenatal care with me since 13 weeks. A+, abs neg, 

Rub Imm, Treponemal ab neg, Hep B neg. abnormal 1hr, normal 3hr.GBS neg.


History of Any Multi-Drug Resistant Organisms: None Reported


Past Surgical History: No Surgical Hx Reported


Past Anesthesia/Blood Transfusion Reactions: No Reported Reaction


Past Psychological History: No Psychological Hx Reported


Smoking Status: Never smoker


Past Alcohol Use History: None Reported


Past Drug Use History: None Reported





- Past Family History


  ** Mother


Family Medical History: No Reported History





  ** Father


Family Medical History: Hypertension (patient has 3 sibling, all doing well .  

Grandfather had a history of prostate cancer and grandmother with diabetes)





Medications and Allergies


 Home Medications











 Medication  Instructions  Recorded  Confirmed  Type


 


Prenatal Vit-Iron-Folic Acid 1 cap PO HS 09/24/17 09/24/17 History





[Prenatal-U Capsule (formulary)]    











 Allergies











Allergy/AdvReac Type Severity Reaction Status Date / Time


 


No Known Allergies Allergy   Verified 09/24/17 21:07














Physical Exam


Vitals: 


 Vital Signs











  Temp Pulse Pulse Pulse Resp BP BP


 


 09/25/17 12:04  97.7 F   57 L   20   123/79


 


 09/25/17 08:10  98.9 F   65   20   124/75


 


 09/25/17 00:49  98.6 F    62  14   116/71


 


 09/24/17 20:45  97.7 F    57 L  20   130/75


 


 09/24/17 19:14  97.8 F  60    18  126/78 


 


 09/24/17 19:06   60    18  126/78 


 


 09/24/17 17:30   65    18  117/63 


 


 09/24/17 15:42   73    18  119/72 


 


 09/24/17 15:38  97.8 F  73    18  131/75 














  Pulse Ox


 


 09/25/17 12:04  99


 


 09/25/17 08:10  99


 


 09/25/17 00:49  98


 


 09/24/17 20:45  100


 


 09/24/17 19:14  98


 


 09/24/17 19:06  98


 


 09/24/17 17:30  99


 


 09/24/17 15:42  97


 


 09/24/17 15:38  99








 Intake and Output











 09/25/17 09/25/17 09/25/17





 06:59 14:59 22:59


 


Other:   


 


  # Voids 1 2 














- Constitutional


General appearance: average body habitus, cooperative





- EENT


Eyes: no abnormal pupil, PERRLA, no photophobia, no ptosis


Ears: bilateral: normal





- Neck


Neck: no lymphadenopathy, no normal ROM


Carotids: bilateral: upstroke normal


Thyroid: negative: normal size





- Respiratory


Respiratory: bilateral: CTA, negative: rhonchi, wheezing





- Cardiovascular


Rhythm: regular


Heart sounds: normal: S1, S2


Abnormal Heart Sounds: no systolic murmur, no diastolic murmur





- Gastrointestinal


General gastrointestinal: normal bowel sounds, no organomegaly, soft, no 

splenomegaly, tenderness


Localized gastrointestinal: tender: RUQ, epigastric periumbilical





- Integumentary


Integumentary: no calor, no cellulitis, no cyanotic





- Neurologic


Neurologic: CNII-XII intact





- Musculoskeletal


Musculoskeletal: gait normal





- Psychiatric


Psychiatric: A&O x's 3, appropriate affect (he)





Results


CBC & Chem 7: 


 09/24/17 16:09





 09/24/17 16:09


Labs: 


 Abnormal Lab Results - Last 24 Hours (Table)











  09/24/17 09/24/17 09/24/17 Range/Units





  16:09 16:09 16:09 


 


Neutrophils #   8.1 H   (1.3-7.7)  k/uL


 


Lymphocytes #   0.9 L   (1.0-4.8)  k/uL


 


AST  116 H    (14-36)  U/L


 


ALT  77 H    (9-52)  U/L


 


Triglycerides     (<150)  mg/dL


 


Amylase  155 H    ()  U/L


 


Lipase  866 H    ()  U/L


 


Urine Protein    Trace H  (Negative)  


 


Urine Blood    Small H  (Negative)  


 


Ur Leukocyte Esterase    Large H  (Negative)  


 


Urine RBC    8 H  (0-5)  /hpf


 


Urine WBC    23 H  (0-5)  /hpf


 


Urine Mucus    Rare H  (None)  /hpf














  09/25/17 Range/Units





  06:53 


 


Neutrophils #   (1.3-7.7)  k/uL


 


Lymphocytes #   (1.0-4.8)  k/uL


 


AST   (14-36)  U/L


 


ALT   (9-52)  U/L


 


Triglycerides  170 H  (<150)  mg/dL


 


Amylase   ()  U/L


 


Lipase   ()  U/L


 


Urine Protein   (Negative)  


 


Urine Blood   (Negative)  


 


Ur Leukocyte Esterase   (Negative)  


 


Urine RBC   (0-5)  /hpf


 


Urine WBC   (0-5)  /hpf


 


Urine Mucus   (None)  /hpf








 Microbiology - Last 24 Hours (Table)











 09/24/17 16:09 Urine Culture - Preliminary





 Urine,Clean Catch 














Thrombosis Risk Factor Assmnt





- Choose All That Apply


Any of the Below Risk Factors Present?: Yes


Each Factor Represents 1 point: Pregnancy or Postpartum


Other Risk Factors: No


Other congenital or acquired thrombophilia - If yes, enter type in comment: No


Thrombosis Risk Factor Assessment Total Risk Factor Score: 1


Thrombosis Risk Factor Assessment Level: Low Risk





Assessment and Plan


Plan: 





27 years old female with no significant past medical history 2 weeks postpartum 

presents with epigastric pain concerning for acute pancreatitis secondary to 

choledocholithiasis


1.  Acute pancreatitis secondary to choledocholithiasis


- Ultrasound suggestive of choledocholithiasis


- Keep nothing by mouth


- Continue fluid at 1 25 mL per hour


- Avoid ibuprofen for concern of acute gastritis


- Continue Protonix 40 mg twice a day a day


- Surgery consult for possible cholecystectomy while patient is in the hospital


- Repeat lipase in a.m.


- We will start patient on clear liquid diet


- Continue empiric antibiotics with Zosyn





2.  GI prophylaxis - Protonix 40 mg twice daily





3.  DVT prophylaxis- continue mechanical prophylaxis with SERG hose

## 2017-09-26 LAB
ALP SERPL-CCNC: 95 U/L (ref 38–126)
ALT SERPL-CCNC: 66 U/L (ref 9–52)
AMYLASE SERPL-CCNC: 208 U/L (ref 30–110)
ANION GAP SERPL CALC-SCNC: 9 MMOL/L
AST SERPL-CCNC: 32 U/L (ref 14–36)
BASOPHILS # BLD AUTO: 0 K/UL (ref 0–0.2)
BASOPHILS NFR BLD AUTO: 1 %
BUN SERPL-SCNC: 4 MG/DL (ref 7–17)
CALCIUM SPEC-MCNC: 8.7 MG/DL (ref 8.4–10.2)
CH: 32.9
CHCM: 34.1
CHLORIDE SERPL-SCNC: 107 MMOL/L (ref 98–107)
CO2 SERPL-SCNC: 27 MMOL/L (ref 22–30)
EOSINOPHIL # BLD AUTO: 0.1 K/UL (ref 0–0.7)
EOSINOPHIL NFR BLD AUTO: 3 %
ERYTHROCYTE [DISTWIDTH] IN BLOOD BY AUTOMATED COUNT: 3.83 M/UL (ref 3.8–5.4)
ERYTHROCYTE [DISTWIDTH] IN BLOOD: 13.5 % (ref 11.5–15.5)
GLUCOSE SERPL-MCNC: 88 MG/DL (ref 74–99)
HCT VFR BLD AUTO: 37 % (ref 34–46)
HDW: 3.03
HGB BLD-MCNC: 12.8 GM/DL (ref 11.4–16)
LUC NFR BLD AUTO: 2 %
LYMPHOCYTES # SPEC AUTO: 1.7 K/UL (ref 1–4.8)
LYMPHOCYTES NFR SPEC AUTO: 34 %
MCH RBC QN AUTO: 33.6 PG (ref 25–35)
MCHC RBC AUTO-ENTMCNC: 34.7 G/DL (ref 31–37)
MCV RBC AUTO: 96.9 FL (ref 80–100)
MONOCYTES # BLD AUTO: 0.3 K/UL (ref 0–1)
MONOCYTES NFR BLD AUTO: 6 %
NEUTROPHILS # BLD AUTO: 2.7 K/UL (ref 1.3–7.7)
NEUTROPHILS NFR BLD AUTO: 55 %
NON-AFRICAN AMERICAN GFR(MDRD): >60
POTASSIUM SERPL-SCNC: 3.5 MMOL/L (ref 3.5–5.1)
PROT SERPL-MCNC: 6.3 G/DL (ref 6.3–8.2)
SODIUM SERPL-SCNC: 143 MMOL/L (ref 137–145)
WBC # BLD AUTO: 0.1 10*3/UL
WBC # BLD AUTO: 5 K/UL (ref 3.8–10.6)
WBC (PEROX): 4.9

## 2017-09-26 RX ADMIN — PANTOPRAZOLE SODIUM SCH MG: 40 INJECTION, POWDER, FOR SOLUTION INTRAVENOUS at 09:30

## 2017-09-26 RX ADMIN — DEXTROSE MONOHYDRATE SCH MLS/HR: 5 INJECTION, SOLUTION INTRAVENOUS at 00:22

## 2017-09-26 RX ADMIN — CEFAZOLIN SCH MLS/HR: 330 INJECTION, POWDER, FOR SOLUTION INTRAMUSCULAR; INTRAVENOUS at 20:47

## 2017-09-26 RX ADMIN — PANTOPRAZOLE SODIUM SCH MG: 40 INJECTION, POWDER, FOR SOLUTION INTRAVENOUS at 20:49

## 2017-09-26 RX ADMIN — CEFAZOLIN SCH: 330 INJECTION, POWDER, FOR SOLUTION INTRAMUSCULAR; INTRAVENOUS at 15:44

## 2017-09-26 RX ADMIN — DEXTROSE MONOHYDRATE SCH MLS/HR: 5 INJECTION, SOLUTION INTRAVENOUS at 15:48

## 2017-09-26 RX ADMIN — DEXTROSE MONOHYDRATE SCH MLS/HR: 5 INJECTION, SOLUTION INTRAVENOUS at 08:14

## 2017-09-26 RX ADMIN — CEFAZOLIN SCH: 330 INJECTION, POWDER, FOR SOLUTION INTRAMUSCULAR; INTRAVENOUS at 11:43

## 2017-09-26 RX ADMIN — CEFAZOLIN SCH MLS/HR: 330 INJECTION, POWDER, FOR SOLUTION INTRAMUSCULAR; INTRAVENOUS at 06:08

## 2017-09-26 RX ADMIN — CEFAZOLIN SCH MLS/HR: 330 INJECTION, POWDER, FOR SOLUTION INTRAMUSCULAR; INTRAVENOUS at 14:23

## 2017-09-26 RX ADMIN — PRENATAL W/O A VIT W/ FE FUMARATE-FA CAP 106.5-1 MG SCH EACH: 106.5 CAPSULE ORAL at 20:45

## 2017-09-26 NOTE — P.GSCN
History of Present Illness


Consult date: 09/26/17


Reason for Consult: 





Cholelithiasis


History of present illness: 





This a 19-year-old female who sees Dr. haas as outpatient.  Patient was 

admitted with complaints of right upper quadrant pain H her back.  Ultrasound 

shows evidence of cholelithiasis.  Patient states that she has had some mild 

complaints of upper quadrant pain over the last several months.  She is 3 weeks 

postpartum.  She was worked up emergency room found evidence of a urinary tract 

infection.





Past Medical History


Past Medical History: No Reported History (normal vaginal delivery 2 weeks ago )


Additional Past Medical History / Comment(s): Obstetric history: This is her 

first pregnancy and she had prenatal care with me since 13 weeks. A+, abs neg, 

Rub Imm, Treponemal ab neg, Hep B neg. abnormal 1hr, normal 3hr.GBS neg.


History of Any Multi-Drug Resistant Organisms: None Reported


Past Surgical History: No Surgical Hx Reported


Past Anesthesia/Blood Transfusion Reactions: No Reported Reaction


Past Psychological History: No Psychological Hx Reported


Smoking Status: Never smoker


Past Alcohol Use History: None Reported


Past Drug Use History: None Reported





- Past Family History


  ** Mother


Family Medical History: No Reported History





  ** Father


Family Medical History: Hypertension (patient has 3 sibling, all doing well .  

Grandfather had a history of prostate cancer and grandmother with diabetes)





Medications and Allergies


 Home Medications











 Medication  Instructions  Recorded  Confirmed  Type


 


Prenatal Vit-Iron-Folic Acid 1 cap PO HS 09/24/17 09/24/17 History





[Prenatal-U Capsule (formulary)]    











 Allergies











Allergy/AdvReac Type Severity Reaction Status Date / Time


 


No Known Allergies Allergy   Verified 09/24/17 21:07














Surgical - Exam


 Vital Signs











Temp Pulse Resp BP Pulse Ox


 


 97.8 F   73   18   131/75   99 


 


 09/24/17 15:38  09/24/17 15:38  09/24/17 15:38  09/24/17 15:38  09/24/17 15:38














- General


well developed, no distress





- Eyes


PERRL





- ENT


normal pinna





- Neck


no masses





- Respiratory


normal expansion





- Cardiovascular


Rhythm: regular





- Abdomen


Abdomen: soft, non tender





Results





- Labs





 09/26/17 07:34





 09/26/17 07:34


 Abnormal Lab Results - Last 24 Hours (Table)











  09/26/17 Range/Units





  07:34 


 


BUN  4 L  (7-17)  mg/dL


 


ALT  66 H  (9-52)  U/L


 


Amylase  208 H  ()  U/L


 


Lipase  1200 H  ()  U/L








 Microbiology - Last 24 Hours (Table)











 09/24/17 19:04 Blood Culture - Preliminary





 Blood    No Growth after 24 hours


 


 09/24/17 16:09 Urine Culture - Final





 Urine,Clean Catch 








 Diabetes panel











  09/26/17 Range/Units





  07:34 


 


Sodium  143  (137-145)  mmol/L


 


Potassium  3.5  (3.5-5.1)  mmol/L


 


Chloride  107  ()  mmol/L


 


Carbon Dioxide  27  (22-30)  mmol/L


 


BUN  4 L  (7-17)  mg/dL


 


Creatinine  0.74  (0.52-1.04)  mg/dL


 


Glucose  88  (74-99)  mg/dL


 


Calcium  8.7  (8.4-10.2)  mg/dL


 


AST  32  (14-36)  U/L


 


ALT  66 H  (9-52)  U/L


 


Alkaline Phosphatase  95  ()  U/L


 


Total Protein  6.3  (6.3-8.2)  g/dL


 


Albumin  3.6  (3.5-5.0)  g/dL








 Calcium panel











  09/26/17 Range/Units





  07:34 


 


Calcium  8.7  (8.4-10.2)  mg/dL


 


Albumin  3.6  (3.5-5.0)  g/dL








 Pituitary panel











  09/26/17 Range/Units





  07:34 


 


Sodium  143  (137-145)  mmol/L


 


Potassium  3.5  (3.5-5.1)  mmol/L


 


Chloride  107  ()  mmol/L


 


Carbon Dioxide  27  (22-30)  mmol/L


 


BUN  4 L  (7-17)  mg/dL


 


Creatinine  0.74  (0.52-1.04)  mg/dL


 


Glucose  88  (74-99)  mg/dL


 


Calcium  8.7  (8.4-10.2)  mg/dL








 Adrenal panel











  09/26/17 Range/Units





  07:34 


 


Sodium  143  (137-145)  mmol/L


 


Potassium  3.5  (3.5-5.1)  mmol/L


 


Chloride  107  ()  mmol/L


 


Carbon Dioxide  27  (22-30)  mmol/L


 


BUN  4 L  (7-17)  mg/dL


 


Creatinine  0.74  (0.52-1.04)  mg/dL


 


Glucose  88  (74-99)  mg/dL


 


Calcium  8.7  (8.4-10.2)  mg/dL


 


Total Bilirubin  0.7  (0.2-1.3)  mg/dL


 


AST  32  (14-36)  U/L


 


ALT  66 H  (9-52)  U/L


 


Alkaline Phosphatase  95  ()  U/L


 


Total Protein  6.3  (6.3-8.2)  g/dL


 


Albumin  3.6  (3.5-5.0)  g/dL














Assessment and Plan


Plan: 





Cholelithiasis with history of choledocholithiasis.  Patient has been treated 

for her urinary tract infection.  She'll undergo laparoscopic cholecystectomy 

in the a.m.

## 2017-09-26 NOTE — P.PN
Subjective





27 years old female postpartum 2 weeks presents with acute abdominal pain that 

started yesterday involving the right upper quadrant and the epigastric area.  

Patient said that she had similar presentation while she was pregnant but could 

not undergo any surgery due to the pregnancy.  She had similar symptoms 2 days 

ago but went away by itself.  She was diagnosed to have gallstones during her 

pregnancy.  Patient denies any nausea, vomiting, diarrhea or constipation.  

Denies any blood in stool.  No change in stool, had her last bowel movement 

yesterday stopped she does uses Prilosec over-the-counter which has helped with 

her abdominal pain and had an ibuprofen for headache on Friday but apart from 

that she is on no medication.  She denies any chest pain or shortness of breath 

but could not to 3 due to the epigastric pain she was having.  She denies any 

palpitation or lower extremity edema, patient denies any history of blood clots 

in the past








9/26: She has been seen by Dr. Magana and diet was started.  If lab work is 

improved he is planning for laparoscopic cholecystectomy today.  Patient is on 

IV antibiotics.  AST is normal at 32 and ALT is 66.  Triglycerides 170, 

cholesterol 176, LDL 96, HDL 46.  Amylase has increased to 208 and lipase is 

increased to 1200.


Dr. Magana has decided no surgery for today.  Surgery consult has been changed 

to Dr. Alas and he is planning for surgery tomorrow.  IV fluids increased 

and patient will be given 2 L and then 150 mL per hour.  Patient denies any 

nausea.








Objective





- Vital Signs


Vital signs: 


 Vital Signs











Temp  97.0 F L  09/26/17 08:35


 


Pulse  59 L  09/26/17 08:35


 


Resp  20   09/26/17 08:35


 


BP  142/83   09/26/17 08:35


 


Pulse Ox  95   09/26/17 08:35








 Intake & Output











 09/25/17 09/26/17 09/26/17





 18:59 06:59 18:59


 


Intake Total  1900 


 


Balance  1900 


 


Intake:   


 


  Intake, IV Titration  1900 





  Amount   


 


    Sodium Chloride 0.9% 1,  1900 





    000 ml @ 125 mls/hr IV .   





    Q8H RUMA Rx#:[id number]   


 


Other:   


 


  # Voids 1 1 














- Exam





General appearance: average body habitus, cooperative





- EENT


Eyes: no abnormal pupil, PERRLA, no photophobia, no ptosis


Ears: bilateral: normal





- Neck


Neck: no lymphadenopathy, no normal ROM


Carotids: bilateral: upstroke normal


Thyroid: negative: normal size





- Respiratory


Respiratory: bilateral: CTA, negative: rhonchi, wheezing





- Cardiovascular


Rhythm: regular


Heart sounds: normal: S1, S2


Abnormal Heart Sounds: no systolic murmur, no diastolic murmur





- Gastrointestinal


General gastrointestinal: normal bowel sounds, no organomegaly, soft, no 

splenomegaly, tenderness


Localized gastrointestinal: tender: RUQ, epigastric periumbilical





- Integumentary


Integumentary: no calor, no cellulitis, no cyanotic





- Neurologic


Neurologic: CNII-XII intact





- Musculoskeletal


Musculoskeletal: gait normal





- Psychiatric


Psychiatric: A&O x's 3, appropriate affect 








- Labs


CBC & Chem 7: 


 09/26/17 07:34





 09/26/17 07:34


Labs: 


 Abnormal Lab Results - Last 24 Hours (Table)











  09/26/17 Range/Units





  07:34 


 


BUN  4 L  (7-17)  mg/dL


 


ALT  66 H  (9-52)  U/L


 


Amylase  208 H  ()  U/L


 


Lipase  1200 H  ()  U/L








 Microbiology - Last 24 Hours (Table)











 09/24/17 19:04 Blood Culture - Preliminary





 Blood    No Growth after 24 hours


 


 09/24/17 16:09 Urine Culture - Final





 Urine,Clean Catch 














Assessment and Plan


Plan: 





1.  Acute pancreatitis secondary to choledocholithiasis


- Ultrasound suggestive of choledocholithiasis


- Keep nothing by mouth


- Continue fluid at 125 mL per hour


- Avoid ibuprofen for concern of acute gastritis


- Continue Protonix 40 mg twice a day a day


- Surgery consult for possible cholecystectomy while patient is in the hospital


- Repeat lipase in a.m.


- We will start patient on clear liquid diet


- Continue empiric antibiotics with Zosyn





2.  GI prophylaxis - Protonix 40 mg twice daily





3.  DVT prophylaxis- continue mechanical prophylaxis with SERG hose





4.  Recent vaginal delivery without complications.





Discharge plan: Return home





Impression and plan of care have been directed as dictated by the signing 

physician.  [place] nurse practitioner acting as scribe for signing 

physician.

## 2017-09-27 LAB
ALP SERPL-CCNC: 88 U/L (ref 38–126)
ALT SERPL-CCNC: 42 U/L (ref 9–52)
AMYLASE SERPL-CCNC: 374 U/L (ref 30–110)
ANION GAP SERPL CALC-SCNC: 10 MMOL/L
AST SERPL-CCNC: 25 U/L (ref 14–36)
BUN SERPL-SCNC: 4 MG/DL (ref 7–17)
CALCIUM SPEC-MCNC: 8.6 MG/DL (ref 8.4–10.2)
CHLORIDE SERPL-SCNC: 107 MMOL/L (ref 98–107)
CO2 SERPL-SCNC: 24 MMOL/L (ref 22–30)
GLUCOSE SERPL-MCNC: 92 MG/DL (ref 74–99)
NON-AFRICAN AMERICAN GFR(MDRD): >60
POTASSIUM SERPL-SCNC: 3.5 MMOL/L (ref 3.5–5.1)
PROT SERPL-MCNC: 6.4 G/DL (ref 6.3–8.2)
SODIUM SERPL-SCNC: 141 MMOL/L (ref 137–145)

## 2017-09-27 PROCEDURE — 0FT44ZZ RESECTION OF GALLBLADDER, PERCUTANEOUS ENDOSCOPIC APPROACH: ICD-10-PCS

## 2017-09-27 RX ADMIN — DEXTROSE MONOHYDRATE SCH MLS/HR: 5 INJECTION, SOLUTION INTRAVENOUS at 17:12

## 2017-09-27 RX ADMIN — DEXTROSE MONOHYDRATE SCH MLS/HR: 5 INJECTION, SOLUTION INTRAVENOUS at 00:05

## 2017-09-27 RX ADMIN — PANTOPRAZOLE SODIUM SCH MG: 40 INJECTION, POWDER, FOR SOLUTION INTRAVENOUS at 22:24

## 2017-09-27 RX ADMIN — DEXTROSE MONOHYDRATE SCH MLS/HR: 5 INJECTION, SOLUTION INTRAVENOUS at 08:18

## 2017-09-27 RX ADMIN — HYDROMORPHONE HYDROCHLORIDE PRN MG: 1 INJECTION, SOLUTION INTRAMUSCULAR; INTRAVENOUS; SUBCUTANEOUS at 16:09

## 2017-09-27 RX ADMIN — PRENATAL W/O A VIT W/ FE FUMARATE-FA CAP 106.5-1 MG SCH EACH: 106.5 CAPSULE ORAL at 22:24

## 2017-09-27 RX ADMIN — CEFAZOLIN SCH MLS/HR: 330 INJECTION, POWDER, FOR SOLUTION INTRAMUSCULAR; INTRAVENOUS at 03:09

## 2017-09-27 RX ADMIN — HYDROMORPHONE HYDROCHLORIDE PRN MG: 1 INJECTION, SOLUTION INTRAMUSCULAR; INTRAVENOUS; SUBCUTANEOUS at 16:04

## 2017-09-27 RX ADMIN — PANTOPRAZOLE SODIUM SCH MG: 40 INJECTION, POWDER, FOR SOLUTION INTRAVENOUS at 08:18

## 2017-09-27 NOTE — P.DS
Providers


Date of admission: 


09/24/17 18:49





Expected date of discharge: 09/27/17


Attending physician: 


Saadia Arceo





Consults: 





 





09/24/17 18:52


Consult Physician Stat 


   Consulting Provider: Jairo Magana Reason/Comments: choledocholithiasis


   Do you want consulting provider notified?: Yes











Primary care physician: 


[facility] Course: 


27 years old female postpartum 2 weeks presents with acute abdominal pain that 

started yesterday involving the right upper quadrant and the epigastric area.  

Patient said that she had similar presentation while she was pregnant but could 

not undergo any surgery due to the pregnancy.  She had similar symptoms 2 days 

ago but went away by itself.  She was diagnosed to have gallstones during her 

pregnancy.  Patient denies any nausea, vomiting, diarrhea or constipation.  

Denies any blood in stool.  No change in stool, had her last bowel movement 

yesterday stopped she does uses Prilosec over-the-counter which has helped with 

her abdominal pain and had an ibuprofen for headache on Friday but apart from 

that she is on no medication.  She denies any chest pain or shortness of breath 

but could not to 3 due to the epigastric pain she was having.  She denies any 

palpitation or lower extremity edema, patient denies any history of blood clots 

in the past








9/26: She has been seen by Dr. Magana and diet was started.  If lab work is 

improved he is planning for laparoscopic cholecystectomy today.  Patient is on 

IV antibiotics.  AST is normal at 32 and ALT is 66.  Triglycerides 170, 

cholesterol 176, LDL 96, HDL 46.  Amylase has increased to 208 and lipase is 

increased to 1200.


Dr. Magana has decided no surgery for today.  Surgery consult has been changed 

to Dr. Alas and he is planning for surgery tomorrow.  IV fluids increased 

and patient will be given 2 L and then 150 mL per hour.  Patient denies any 

nausea.





9/27: Dr. Alas has patient scheduled for laparoscopic cholecystectomy 

today.  Patient is requesting to go home after procedure.  Patient has been 

cleared by Dr. Wallace for discharge.  Repeat amylase this morning was 374 and 

lipase was 2839.





Discharge diagnoses:


1.  Acute pancreatitis secondary to choledocholithiasis


2.  Recent normal vaginal delivery





Discharge plan: Return home





Impression and plan of care have been directed as dictated by the signing 

physician.  [place] nurse practitioner acting as scribe for signing 

physician.





Cc: Dr. Alexander Mejía


Patient Condition at Discharge: Good





Plan - Discharge Summary


New Discharge Prescriptions: 


New


   Docusate [Colace] 100 mg PO BID #20 capsule


   HYDROcodone/APAP 7.5-325MG [Norco 7.5] 1 each PO Q4H PRN #60 tab


     PRN Reason: Pain





No Action


   Prenatal Vit-Iron-Folic Acid [Prenatal-U Capsule (formulary)] 1 cap PO HS


Discharge Medication List





Prenatal Vit-Iron-Folic Acid [Prenatal-U Capsule (formulary)] 1 cap PO HS 09/24/ 17 [History]


Docusate [Colace] 100 mg PO BID #20 capsule 09/27/17 [Rx]


HYDROcodone/APAP 7.5-325MG [Norco 7.5] 1 each PO Q4H PRN #60 tab 09/27/17 [Rx]








Follow up Appointment(s)/Referral(s): 


Alexander Mejía DO [Primary Care Provider] - 1-2 days


William Alas MD [STAFF PHYSICIAN] - 1 Week


Discharge Disposition: HOME SELF-CARE

## 2017-09-28 VITALS
HEART RATE: 90 BPM | TEMPERATURE: 98.2 F | RESPIRATION RATE: 20 BRPM | DIASTOLIC BLOOD PRESSURE: 79 MMHG | SYSTOLIC BLOOD PRESSURE: 128 MMHG

## 2017-09-28 RX ADMIN — DEXTROSE MONOHYDRATE SCH MLS/HR: 5 INJECTION, SOLUTION INTRAVENOUS at 00:25

## 2018-03-16 NOTE — P.OP
Date of Procedure: 09/27/17


Preoperative Diagnosis: 


Cholelithiasis


Postoperative Diagnosis: 


Cholelithiasis


Procedure(s) Performed: 


Laparoscopic cholecystectomy


Anesthesia: ALANIS


Surgeon: William Alas


Estimated Blood Loss (ml): 5


Pathology: other (Gallbladder)


Condition: stable


Disposition: PACU


Description of Procedure: 


The patient was placed on the operating table.   The patient received a general 

endotracheal tube anesthesia.    The patients abdomen was prepped and draped 

in the usual sterile fashion.    Through an infraumbilical stab incision, the 

fascia of the anterior abdominal wall was grasped with a pair of Kochers and 

then the Veress needle was placed in the peritoneal cavity.   Position of the 

Veress needle was confirmed with positive drop test.   The abdomen was then 

insufflated.  After adequate insufflation, the 10 mm trocar was placed in the 

peritoneal cavity.    Following this the laparoscope was placed in the 

peritoneal cavity. The patient was placed in the head-up, right side up 

position and then a 5 mm trocar was placed in the right lateral and right 

subcostal position under direct visualization.    A 8 mm trocar was placed in 

the epigastric position.  The gallbladder was grasped in the fundus and 

infundibulum.  Traction  on the gallbladder was placed in the lateral and the 

cephalad positions.  The triangle of Calot  was visualized..   The cystic duct 

was bluntly dissected until the union of the cystic duct and common bile duct 

was seen.    The cystic duct was then divided and sealed with the Harmonic 

scissors.  A PDS Endoloop was then placed throughout the cystic duct stump.  

The cystic artery divided and sealed with the Harmonic scissors.   The 

gallbladder was then removed from the liver bed using Harmonic scissors.   The 

gallbladder was then extracted through the epigastric port site.  Operative 

field was checked for any bleeding spots and Harmonic scissors was used to 

coagulate the liver bed.    The abdomen was irrigated.   The trocars were 

removed.  The skin was closed using interrupted 3-0 Vicryl suture.   Dermabond 

dressing were applied.   The patient tolerated the procedure well. Private car

## 2018-06-26 ENCOUNTER — HOSPITAL ENCOUNTER (OUTPATIENT)
Dept: HOSPITAL 47 - RADUSWWP | Age: 28
Discharge: HOME | End: 2018-06-26
Payer: COMMERCIAL

## 2018-06-26 DIAGNOSIS — O44.01: Primary | ICD-10-CM

## 2018-06-26 DIAGNOSIS — Z3A.14: ICD-10-CM

## 2018-06-26 LAB
ERYTHROCYTE [DISTWIDTH] IN BLOOD BY AUTOMATED COUNT: 3.89 M/UL (ref 3.8–5.4)
ERYTHROCYTE [DISTWIDTH] IN BLOOD: 13 % (ref 11.5–15.5)
GLUCOSE SERPL-MCNC: 98 MG/DL (ref 74–99)
HCT VFR BLD AUTO: 35 % (ref 34–46)
HGB BLD-MCNC: 12.5 GM/DL (ref 11.4–16)
MCH RBC QN AUTO: 32.1 PG (ref 25–35)
MCHC RBC AUTO-ENTMCNC: 35.7 G/DL (ref 31–37)
MCV RBC AUTO: 90 FL (ref 80–100)
PLATELET # BLD AUTO: 228 K/UL (ref 150–450)
WBC # BLD AUTO: 8.6 K/UL (ref 3.8–10.6)

## 2018-06-26 PROCEDURE — 86900 BLOOD TYPING SEROLOGIC ABO: CPT

## 2018-06-26 PROCEDURE — 82947 ASSAY GLUCOSE BLOOD QUANT: CPT

## 2018-06-26 PROCEDURE — 86762 RUBELLA ANTIBODY: CPT

## 2018-06-26 PROCEDURE — 86850 RBC ANTIBODY SCREEN: CPT

## 2018-06-26 PROCEDURE — 85027 COMPLETE CBC AUTOMATED: CPT

## 2018-06-26 PROCEDURE — 87340 HEPATITIS B SURFACE AG IA: CPT

## 2018-06-26 PROCEDURE — 86901 BLOOD TYPING SEROLOGIC RH(D): CPT

## 2018-06-26 PROCEDURE — 82565 ASSAY OF CREATININE: CPT

## 2018-06-26 PROCEDURE — 36415 COLL VENOUS BLD VENIPUNCTURE: CPT

## 2018-06-26 PROCEDURE — 87390 HIV-1 AG IA: CPT

## 2018-06-26 PROCEDURE — 76805 OB US >/= 14 WKS SNGL FETUS: CPT

## 2018-06-26 PROCEDURE — 86780 TREPONEMA PALLIDUM: CPT

## 2018-06-27 NOTE — US
EXAMINATION TYPE: US OB >= 14 wk fetus

 

DATE OF EXAM: 6/26/2018

 

COMPARISON: None

 

CLINICAL HISTORY: B36 Confirm Dates positive beta-hCG test

 

TECHNIQUE:  OBTA

 

GESTATIONAL AGE / DATING

Physician Established: Not yet established 

Dates by LMP: (15 weeks/2 days) 

** EDC: 12/16/2018

Dates by First Scan: No previous this is first scan 

Dates by Current Scan: (14 weeks/0 days) 

** EDC: 12/25/2018 

Beta HCG (if available): not available

 

FETAL SURVEY

IUP:  Single

PLACENTA: Posterior     

PREVIA:  Complete

** JENIFFER: 10.0 cm Normal

CERVICAL LENGTH (transabdominal: norm > 3.0cm): 3.3 cm

 

FETAL BIOMETRY

PRESENTATION:  Variable

BPD: 2.4 cm

**  14 weeks / 0 days

HC: 9.1 cm

**  14 weeks / 1 days

AC: 7.3 cm

**  13 weeks / 6 days

FL: 1.3 cm

**  13 weeks / 5 days

ESTIMATED FETAL WEIGHT IN GRAMS:  83.1 grams

ESTIMATED FETAL WEIGHT IN LBS/OZ:  0 lbs. 3 oz. 

WEIGHT PERCENTAGE BASED ON ESTABLISHED DATES:   <3%

HC/AC: 1.2 Normal

FL/AC: 17.1 Normal 

HEART RATE:  129 bpm 

RHYTHM:  Monica

 

Single live intrauterine gestation is confirmed as gestational sac and fetal pole are identified. Yol
k sac is not clearly seen. Amniotic fluid index is within normal limits. There is evidence of placent
a previa currently with cervical os blocked by overlying placenta. There are both presentation to fet
us is seen during real-time scanning. Fetal biometry measurements are congruent and felt within monica
l limits. No cervical thinning noted.

 

 

 

IMPRESSION:

Single live intrauterine gestation is seen, biparietal diameter is 2.4 cm corresponding to 14 weeks 0
 day old fetus. Placenta previa noted currently. Progress ultrasound advised.

## 2018-12-18 ENCOUNTER — HOSPITAL ENCOUNTER (INPATIENT)
Dept: HOSPITAL 47 - 4FBP | Age: 28
LOS: 1 days | Discharge: HOME | End: 2018-12-19
Attending: OBSTETRICS & GYNECOLOGY | Admitting: OBSTETRICS & GYNECOLOGY
Payer: COMMERCIAL

## 2018-12-18 VITALS — BODY MASS INDEX: 25.1 KG/M2

## 2018-12-18 DIAGNOSIS — Z3A.39: ICD-10-CM

## 2018-12-18 DIAGNOSIS — Z90.49: ICD-10-CM

## 2018-12-18 DIAGNOSIS — Z82.49: ICD-10-CM

## 2018-12-18 LAB
BASOPHILS # BLD AUTO: 0 K/UL (ref 0–0.2)
BASOPHILS NFR BLD AUTO: 0 %
EOSINOPHIL # BLD AUTO: 0.1 K/UL (ref 0–0.7)
EOSINOPHIL NFR BLD AUTO: 2 %
ERYTHROCYTE [DISTWIDTH] IN BLOOD BY AUTOMATED COUNT: 4.02 M/UL (ref 3.8–5.4)
ERYTHROCYTE [DISTWIDTH] IN BLOOD: 13.3 % (ref 11.5–15.5)
HCT VFR BLD AUTO: 38.1 % (ref 34–46)
HGB BLD-MCNC: 13.1 GM/DL (ref 11.4–16)
LYMPHOCYTES # SPEC AUTO: 1.5 K/UL (ref 1–4.8)
LYMPHOCYTES NFR SPEC AUTO: 20 %
MCH RBC QN AUTO: 32.6 PG (ref 25–35)
MCHC RBC AUTO-ENTMCNC: 34.3 G/DL (ref 31–37)
MCV RBC AUTO: 94.9 FL (ref 80–100)
MONOCYTES # BLD AUTO: 0.3 K/UL (ref 0–1)
MONOCYTES NFR BLD AUTO: 4 %
NEUTROPHILS # BLD AUTO: 5.4 K/UL (ref 1.3–7.7)
NEUTROPHILS NFR BLD AUTO: 73 %
PLATELET # BLD AUTO: 159 K/UL (ref 150–450)
WBC # BLD AUTO: 7.5 K/UL (ref 3.8–10.6)

## 2018-12-18 PROCEDURE — 00HU33Z INSERTION OF INFUSION DEVICE INTO SPINAL CANAL, PERCUTANEOUS APPROACH: ICD-10-PCS

## 2018-12-18 PROCEDURE — 86850 RBC ANTIBODY SCREEN: CPT

## 2018-12-18 PROCEDURE — 3E033VJ INTRODUCTION OF OTHER HORMONE INTO PERIPHERAL VEIN, PERCUTANEOUS APPROACH: ICD-10-PCS

## 2018-12-18 PROCEDURE — 86900 BLOOD TYPING SEROLOGIC ABO: CPT

## 2018-12-18 PROCEDURE — 3E0R3BZ INTRODUCTION OF ANESTHETIC AGENT INTO SPINAL CANAL, PERCUTANEOUS APPROACH: ICD-10-PCS

## 2018-12-18 PROCEDURE — 86901 BLOOD TYPING SEROLOGIC RH(D): CPT

## 2018-12-18 PROCEDURE — 85025 COMPLETE CBC W/AUTO DIFF WBC: CPT

## 2018-12-18 PROCEDURE — 10907ZC DRAINAGE OF AMNIOTIC FLUID, THERAPEUTIC FROM PRODUCTS OF CONCEPTION, VIA NATURAL OR ARTIFICIAL OPENING: ICD-10-PCS

## 2018-12-18 PROCEDURE — 0HQ9XZZ REPAIR PERINEUM SKIN, EXTERNAL APPROACH: ICD-10-PCS

## 2018-12-18 RX ADMIN — POTASSIUM CHLORIDE SCH MLS/HR: 14.9 INJECTION, SOLUTION INTRAVENOUS at 06:20

## 2018-12-18 RX ADMIN — POTASSIUM CHLORIDE SCH MLS/HR: 14.9 INJECTION, SOLUTION INTRAVENOUS at 08:59

## 2018-12-18 RX ADMIN — DOCUSATE SODIUM AND SENNOSIDES SCH: 50; 8.6 TABLET ORAL at 19:42

## 2018-12-18 NOTE — P.PROBDLV
Vaginal Delivery Note





- .


Vaginal Delivery Note: 





28 year old  presents at 39 weeks for induction of labor.  Her cervix is 5 

cm dilated, 70% effaced, and -2 station.  She is roz irregularly.  

Fetal heart tones 130-135 with moderate variability and reactive.  Pitocin was 

started.  Amniotomy was performed at 9:25 AM, clear fluid noted.  She did get 

an epidural and was comfortable.  Her cervix was completely dilated at 10:30 

AM.  She pushed, and delivered a viable female infant over intact perineum 

under epidural anesthesia at 10:50 AM.  Head delivered OA, anterior shoulder 

delivered gentle downward guidance followed by posterior shoulder and rest of 

body.  Nose and mouth bulb suctioned, cord clamped and cut, infant placed on 

mother's abdomen.  Apgars 9, 9, weight 6 lbs. 10 oz.  Placenta delivered 

spontaneously, intact with three-vessel cord at 10:53 AM.  Vagina, cervix, and 

perineum were inspected.  First-degree midline laceration was repaired with 3-0 

Vicryl.  Estimated blood loss 200 mL.  Mother and baby in stable condition.

## 2018-12-18 NOTE — P.HPOB
History of Present Illness


H&P Date: 18


Chief Complaint: Induction of Labor





28 year old  presents at 39 weeks for induction of labor.  Her cervix is 5 

cm dilated, 70% effaced, and -2 station.  She is roz irregularly.  

Fetal heart tones 130-135 with moderate variability and reactive.





Review of Systems


All systems: negative


Constitutional: Denies chills, Denies fever


Eyes: denies blurred vision, denies pain


Ears, nose, mouth and throat: Denies headache, Denies sore throat


Cardiovascular: Denies chest pain, Denies shortness of breath


Respiratory: Denies cough


Gastrointestinal: Denies abdominal pain, Denies diarrhea, Denies nausea, Denies 

vomiting


Genitourinary: Denies dysuria, Denies hematuria


Musculoskeletal: Denies myalgias


Integumentary: Denies pruritus, Denies rash


Neurological: Denies numbness, Denies weakness


Psychiatric: Denies anxiety, Denies depression


Endocrine: Denies fatigue, Denies weight change





Past Medical History


Past Medical History: No Reported History


Additional Past Medical History / Comment(s): Obstetric history: First 

pregnancy was a vaginal delivery.  This is her second pregnancy and she had 

prenatal care with me since 13 weeks. A+, abs neg, Rub Imm, Treponemal ab neg, 

Hep B neg. she has gestational diabetes, diet-controlled.  NSTs have been 

reactive.  GBS neg.


History of Any Multi-Drug Resistant Organisms: None Reported


Past Surgical History: Cholecystectomy


Past Anesthesia/Blood Transfusion Reactions: No Reported Reaction


Past Psychological History: No Psychological Hx Reported


Smoking Status: Never smoker


Past Alcohol Use History: None Reported


Past Drug Use History: None Reported





- Past Family History


  ** Mother


Family Medical History: No Reported History





  ** Father


Family Medical History: Hypertension





Medications and Allergies


 Home Medications











 Medication  Instructions  Recorded  Confirmed  Type


 


Prenatal Vit-Iron-Folic Acid 1 cap PO HS 17 History





[Prenatal-U Capsule (formulary)]    


 


Ranitidine HCl [Zantac] 1 tab PO ONCE 18 History











 Allergies











Allergy/AdvReac Type Severity Reaction Status Date / Time


 


No Known Allergies Allergy   Verified 18 06:17














Exam


Osteopathic Statement: *.  No significant issues noted on an osteopathic 

structural exam other than those noted in the History and Physical/Consult.


 Vital Signs











  Temp Pulse Resp BP Pulse Ox


 


 18 11:55  96.5 F L  67  18  126/74 


 


 18 11:40   71  18  124/76 


 


 18 11:25  96.5 F L  68  18  120/70 


 


 18 11:10   80  18  129/72 


 


 18 10:55  97.3 F L  76  18  135/66 


 


 18 06:18  95.9 F L  74  16  129/80  100








 Intake and Output











 18





 22:59 06:59 14:59


 


Intake Total   13.1


 


Balance   13.1


 


Intake:   


 


  Intake, IV Titration   13.1





  Amount   


 


    Oxytocin 20 Units/1000 ml   13.1





    Ns 1,000 ml @ 1   





    MILLIUNIT/MIN 3 mls/hr IV   





    .Q24H [facility] Rx#:[id number]   


 


Other:   


 


  Weight  68.492 kg 














Heart: Regular rate and rhythm


Lungs: Clear to auscultation bilaterally


Abdomen: Soft, nontender


Extremities: Negative Homans sign





Results


Result Diagrams: 


 18 06:25








Assessment and Plan


(1) Normal labor


Current Visit: No   Status: Resolved   Code(s): O80 - ENCOUNTER FOR FULL-TERM 

UNCOMPLICATED DELIVERY; Z37.9 - OUTCOME OF DELIVERY, UNSPECIFIED   SNOMED Code(s

): 18101047


   


Plan: 





1.  Admit to family birth place


2.  Induction of labor with amniotomy and Pitocin


3.  Anticipate normal vaginal delivery

## 2018-12-19 VITALS
RESPIRATION RATE: 18 BRPM | SYSTOLIC BLOOD PRESSURE: 131 MMHG | TEMPERATURE: 97.9 F | DIASTOLIC BLOOD PRESSURE: 73 MMHG | HEART RATE: 68 BPM

## 2018-12-19 RX ADMIN — DOCUSATE SODIUM AND SENNOSIDES SCH: 50; 8.6 TABLET ORAL at 13:19

## 2018-12-19 NOTE — P.DS
Providers


Date of admission: 


12/18/18 06:08





Expected date of discharge: 12/19/18


Attending physician: 


Latrice Latham





Primary care physician: 


Dung Chaudhari








- Discharge Diagnosis(es)


(1) Normal vaginal delivery


Current Visit: No   Status: Acute   


Hospital Course: 





Patient presented for induction of labor.  She underwent normal vaginal 

delivery.  Her postpartum course was uncomplicated.  She'll be discharged home 

postpartum day #1 in stable condition to follow-up with me in 6 weeks.





Plan - Discharge Summary


New Discharge Prescriptions: 


New


   Ibuprofen [Motrin] 600 mg PO Q6HR PRN #30 tab


     PRN Reason: Mild Pain Or Fever >= 100.5





No Action


   Prenatal Vit-Iron-Folic Acid [Prenatal-U Capsule (formulary)] 1 cap PO HS


   Ranitidine HCl [Zantac] 1 tab PO ONCE


Discharge Medication List





Prenatal Vit-Iron-Folic Acid [Prenatal-U Capsule (formulary)] 1 cap PO HS 09/24/ 17 [History]


Ranitidine HCl [Zantac] 1 tab PO ONCE 12/18/18 [History]


Ibuprofen [Motrin] 600 mg PO Q6HR PRN #30 tab 12/19/18 [Rx]








Follow up Appointment(s)/Referral(s): 


Latrice Latham DO [Doctor of Osteopathic Medicine] - 6 Weeks


Discharge Disposition: HOME SELF-CARE

## 2019-03-17 ENCOUNTER — HOSPITAL ENCOUNTER (EMERGENCY)
Dept: HOSPITAL 47 - EC | Age: 29
Discharge: HOME | End: 2019-03-17
Payer: COMMERCIAL

## 2019-03-17 VITALS
SYSTOLIC BLOOD PRESSURE: 148 MMHG | TEMPERATURE: 98.3 F | HEART RATE: 105 BPM | RESPIRATION RATE: 18 BRPM | DIASTOLIC BLOOD PRESSURE: 98 MMHG

## 2019-03-17 DIAGNOSIS — J10.1: Primary | ICD-10-CM

## 2019-03-17 DIAGNOSIS — Z79.899: ICD-10-CM

## 2019-03-17 PROCEDURE — 71046 X-RAY EXAM CHEST 2 VIEWS: CPT

## 2019-03-17 PROCEDURE — 87081 CULTURE SCREEN ONLY: CPT

## 2019-03-17 PROCEDURE — 99284 EMERGENCY DEPT VISIT MOD MDM: CPT

## 2019-03-17 PROCEDURE — 87502 INFLUENZA DNA AMP PROBE: CPT

## 2019-03-17 PROCEDURE — 87430 STREP A AG IA: CPT

## 2019-03-17 NOTE — ED
General Adult HPI





- General


Chief complaint: ENT


Stated complaint: Dizzy


Time Seen by Provider: 03/17/19 13:11


Source: patient, RN notes reviewed


Mode of arrival: ambulatory


Limitations: language barrier





- History of Present Illness


Initial comments: 





This a 28-year-old female presents emergency Department with chief complaint of 

fever cough congestion sore throat.  Patient states his symptoms started last 

few days.  Temp was 103 last night.  Patient states she feels achy, chills.  She

complains that she's had intermittent sore throat no difficulty swallowing.  No 

recent Tylenol Motrin.  She did take some Robitussin for her cough.  Her cough 

is occasionally productive denies any chest pain or shortness breath currently. 

Denies any nausea vomiting diarrhea constipation.  Denies any chance pregnancy.





- Related Data


                                Home Medications











 Medication  Instructions  Recorded  Confirmed


 


Prenatal Vit-Iron-Folic Acid 1 cap PO HS 09/24/17 12/18/18





[Prenatal-U Capsule (formulary)]   


 


Ranitidine HCl [Zantac] 1 tab PO ONCE 12/18/18 12/18/18








                                  Previous Rx's











 Medication  Instructions  Recorded


 


Ibuprofen [Motrin] 600 mg PO Q6HR PRN #30 tab 12/19/18


 


Oseltamivir [Tamiflu] 75 mg PO Q12HR #10 cap 03/17/19











                                    Allergies











Allergy/AdvReac Type Severity Reaction Status Date / Time


 


No Known Allergies Allergy   Verified 03/17/19 13:08














Review of Systems


ROS Statement: 


Those systems with pertinent positive or pertinent negative responses have been 

documented in the HPI.





ROS Other: All systems not noted in ROS Statement are negative.





Past Medical History


Past Medical History: No Reported History


Additional Past Medical History / Comment(s): Obstetric history: First pregnancy

 was a vaginal delivery.  This is her second pregnancy and she had prenatal care

 with me since 13 weeks. A+, abs neg, Rub Imm, Treponemal ab neg, Hep B neg. she

 has gestational diabetes, diet-controlled.  NSTs have been reactive.  GBS neg.


History of Any Multi-Drug Resistant Organisms: None Reported


Past Surgical History: Cholecystectomy


Past Anesthesia/Blood Transfusion Reactions: No Reported Reaction


Past Psychological History: No Psychological Hx Reported


Smoking Status: Never smoker


Past Alcohol Use History: None Reported


Past Drug Use History: None Reported





- Past Family History


  ** Mother


Family Medical History: No Reported History





  ** Father


Family Medical History: Hypertension





General Exam


Limitations: language barrier


General appearance: alert, in no apparent distress


Head exam: Present: atraumatic, normocephalic, normal inspection


Eye exam: Present: normal appearance, PERRL, EOMI.  Absent: scleral icterus, 

conjunctival injection, periorbital swelling


ENT exam: Present: normal exam, normal oropharynx, mucous membranes moist, TM's 

normal bilaterally, normal external ear exam


Neck exam: Present: normal inspection, full ROM.  Absent: tenderness, 

meningismus, lymphadenopathy


Respiratory exam: Present: normal lung sounds bilaterally.  Absent: respiratory 

distress, wheezes, rales, rhonchi, stridor


Cardiovascular Exam: Present: regular rate, normal rhythm, normal heart sounds. 

 Absent: systolic murmur, diastolic murmur, rubs, gallop, clicks


GI/Abdominal exam: Present: soft, normal bowel sounds.  Absent: distended, 

tenderness, guarding, rebound, rigid


Neurological exam: Present: alert, oriented X3, CN II-XII intact


Skin exam: Present: warm, dry, intact, normal color.  Absent: rash





Course


                                   Vital Signs











  03/17/19 03/17/19





  13:06 13:36


 


Temperature 98.3 F 


 


Pulse Rate 105 H 


 


Respiratory 18 18





Rate  


 


Blood Pressure 148/98 


 


O2 Sat by Pulse 100 





Oximetry  














Medical Decision Making





- Medical Decision Making





28-year-old female presented for fever cough congestion.  Patient is influenza A

 positive.  Patient continue Tylenol and Motrin.  Patient offered Tamiflu.  

Return parameters were discussed.





- Lab Data


                                   Lab Results











  03/17/19 03/17/19 Range/Units





  13:32 13:32 


 


Influenza Type A RNA  Detected H   (Not Detectd)  


 


Influenza Type B (PCR)  Not Detected   (Not Detectd)  


 


Group A Strep Rapid   Negative  (Negative)  














Disposition


Clinical Impression: 


 Influenza





Disposition: HOME SELF-CARE


Condition: Stable


Instructions (If sedation given, give patient instructions):  Influenza (ED)


Additional Instructions: 


Please return to the Emergency Department if symptoms worsen or any other 

concerns.


Prescriptions: 


Oseltamivir [Tamiflu] 75 mg PO Q12HR #10 cap


Is patient prescribed a controlled substance at d/c from ED?: No


Referrals: 


Dung Chaudhari MD [Primary Care Provider] - 1-2 days

## 2019-03-17 NOTE — XR
EXAMINATION TYPE: XR chest 2V

 

DATE OF EXAM: 3/17/2019

 

COMPARISON: NONE

 

HISTORY: Cough and fever

 

TECHNIQUE:  Frontal and lateral views of the chest are obtained.

 

FINDINGS:  Heart and mediastinum are normal. Lungs are clear. Diaphragm is normal. Bony thorax appear
s normal.

 

IMPRESSION:  Normal chest

## 2019-03-21 ENCOUNTER — HOSPITAL ENCOUNTER (EMERGENCY)
Dept: HOSPITAL 47 - EC | Age: 29
Discharge: HOME | End: 2019-03-21
Payer: COMMERCIAL

## 2019-03-21 VITALS
SYSTOLIC BLOOD PRESSURE: 119 MMHG | HEART RATE: 72 BPM | DIASTOLIC BLOOD PRESSURE: 78 MMHG | RESPIRATION RATE: 18 BRPM | TEMPERATURE: 98 F

## 2019-03-21 DIAGNOSIS — H81.399: Primary | ICD-10-CM

## 2019-03-21 PROCEDURE — 99283 EMERGENCY DEPT VISIT LOW MDM: CPT

## 2019-03-21 NOTE — ED
Dizziness HPI





- General


Chief Complaint: Dizziness


Stated Complaint: DIZZINESS X 5 DAYS


Time Seen by Provider: 03/21/19 12:59


Source: patient


Mode of arrival: ambulatory


Limitations: language barrier





- History of Present Illness


Initial Comments: 


28-year-old female who denies past medical history presents today for chief 

complaint of dizziness with head movements.  Patient states that for the past 5 

days when she turns her head quickly she has dizziness which includes the room 

spinning.  Patient denies any lightheaded sensation, chest pain, heart pal

pitations.  Patient states this lasts for less than a minute.  And repeatedly 

occur throughout the day with head movements.  Patient denies any changes in 

gait.  Patient states she did have one episode of emesis due to the dizziness on

Saturday however denies any sense.  Patient denies any diarrhea, fever, neck 

pain, hearing loss, tinnitus, muscle weakness, sensation deficits, headache,  

trauma to the head or neck, visual loss, diplopia, or voice changes, tingling or

paresthesias.  Patient states she did experience left ear pain for a few moments

on Saturday. Remaining ROS (-) upon arrival patient appears well. No signs of 

acute distress, VS WNL. Patient denies any recent shortness of breath, chest 

pain, back pain, abdominal pain, dysuria or hematuria, constipation or diarrhea,

or any other complaints.





- Related Data


                                  Previous Rx's











 Medication  Instructions  Recorded


 


Meclizine [Antivert] 25 mg PO BID 7 Days #14 tab 03/21/19











                                    Allergies











Allergy/AdvReac Type Severity Reaction Status Date / Time


 


No Known Allergies Allergy   Verified 03/21/19 13:15














Review of Systems


ROS Statement: 


Those systems with pertinent positive or pertinent negative responses have been 

documented in the HPI.





ROS Other: All systems not noted in ROS Statement are negative.





Past Medical History


Past Medical History: No Reported History


Additional Past Medical History / Comment(s): Obstetric history: First pregnancy

 was a vaginal delivery.  This is her second pregnancy and she had prenatal care

 with me since 13 weeks. A+, abs neg, Rub Imm, Treponemal ab neg, Hep B neg. she

 has gestational diabetes, diet-controlled.  NSTs have been reactive.  GBS neg.


History of Any Multi-Drug Resistant Organisms: None Reported


Past Surgical History: Cholecystectomy


Past Anesthesia/Blood Transfusion Reactions: No Reported Reaction


Past Psychological History: No Psychological Hx Reported


Smoking Status: Never smoker


Past Alcohol Use History: None Reported


Past Drug Use History: None Reported





- Past Family History


  ** Mother


Family Medical History: No Reported History





  ** Father


Family Medical History: Hypertension





General Exam





- General Exam Comments


Initial Comments: 


General:  The patient is awake and alert, in no distress, and does not appear 

acutely ill. 


Eye:  +3 mm pupils are equal, round and reactive to light, extra-ocular 

movements are intact.  No nystagmus.  There is normal conjunctiva bilaterally.  

No signs of icterus.  


Ears, nose, mouth and throat:  There are moist mucous membranes and no oral 

lesions. 


Neck:  The neck is supple, there is no tenderness or JVD.  


Cardiovascular:  There is a regular rate and rhythm. No murmur, rub or gallop is

 appreciated.


Respiratory:  Lungs are clear to auscultation, respirations are non-labored, 

breath sounds are equal.  No wheezes, stridor, rales, or rhonchi.


Gastrointestinal:  Soft, non-distended, non-tender abdomen without masses or 

organomegaly noted. There is no rebound or guarding present.  


Musculoskeletal:  Normal ROM, no tenderness.  Strength 5/5. Sensation intact. 

Radial pulses equal bilaterally 2+.  


Neurological:  A&O x 3. CN II-XII intact, memory intact to immediately, 

intermediate and long term recall. Able to follow simple verbal. Able to name a 

common object (pen). High quality, labial (pa) and lingual (la) speech. Low 

quality posterior pharynx/larynx (ga) voice sounds. Able to express general 

knowledge (days in a week). No hemineglect or inattention noted.  Finger agnosia

 (-) and spatially oriented (identified L index finger touched R shoulder with L

 index finger).  Light touch and temperature sensation present over the face, 

chest, abdomen, back, UE bilaterally, and LE bilaterally. Able to localize point

 during point localization b/l and extinction. No visible bulk atrophy, 

hypertrophy, fasciculations, or myoclonus of the UE or LE b/l. Full PROM in UE 

and LE b/l. Bilateral muscle strength 5/5 for the following muscles: deltoid, 

biceps, triceps, brachioradialis, wrist extensors/flexor, hip flexor, hip 

abductors/adductors, hamstrings, quadriceps, feet dorsiflexors/plantar flexors. 

Finger to nose, finger to the examiners finger, and heel to shin coordinated 

and accurate b/l. Coordinated and even demonstration of hand flip, finger to 

thumb, and toe tap b/l.  +2 brachioradialis, triceps, patellar, and Achilles DTR

 b/l. Gait is coordinated and even in stride with tandem, toe and heel walk. 

Maintains balance with monopedal stance. (-) Romberg. (-) pronator drift. No 

nuchal rigidity. (-) Brudzinskis and Kernig signs. Cannot induce nystagmus with 

nancy-hallpike, but patient states has dizziness with maneuver.


 Skin:  Skin is warm and dry and no rashes or lesions are noted. No LE edema.


Psychiatric:  Cooperative, appropriate mood & affect, normal judgment.  





Limitations: language barrier





Course


                                   Vital Signs











  03/21/19





  12:55


 


Temperature 98.0 F


 


Pulse Rate 72


 


Respiratory 18





Rate 


 


Blood Pressure 119/78


 


O2 Sat by Pulse 100





Oximetry 














Medical Decision Making





- Medical Decision Making


28-year-old female presenting for dizziness 5 days.  She denies pregnancy.  

Patient states it occurs on and off throughout the day less than 1 minute with 

changes of position of the head.  Patient denies any significant ataxia.  No 

ataxia upon examination.  Patient is no focal neurological deficits or compl

aints.  Ear examination unremarkable.  Neck membranes within normal limits.  

Patient denies any hearing loss or tinnitus.  Patient has no past medical 

history, denies any palpitations, chest pain, lightheadedness or shortness of 

breath.  This time feel patient most likely has benign positional vertigo.  

Patient has no symptoms or history concerning for central cause at this time.  

Patient be discharged with prescription for Antivert.  I did recommend follow-up

 with primary care provider in the next 2-3 days, if symptoms persist or become 

constant I recommended outpatient MRI.  Patient verbalized understanding states 

that she will follow up with primary care provider as directed.  Patient denies 

questions.  Return parameters were discussed at length with patient who 

verbalized understanding.  I discussed the case with attending provider Dr. Huff

 at this time he is agreeable to plan and patient's discharge. Patient discharge

 appearing well.








Disposition


Clinical Impression: 


 Peripheral vertigo





Disposition: HOME SELF-CARE


Condition: Good


Instructions (If sedation given, give patient instructions):  Benign Paroxysmal 

Positional Vertigo (ED), Dizziness (ED)


Additional Instructions: 


Please use medication as discussed.  Please follow-up with family doctor in the 

next 2 days of symptoms have not improved, if symptoms are persistent > 2 weeks 

I would recommend outpatient MRI, or return to the emergency department.  Please

 return to emergency room if the symptoms increase or worsen or for any other 

concerns.


Prescriptions: 


Meclizine [Antivert] 25 mg PO BID 7 Days #14 tab


Is patient prescribed a controlled substance at d/c from ED?: No


Referrals: 


Dung Chaudhari MD [Primary Care Provider] - 1-2 days


Time of Disposition: 13:30

## 2019-03-27 ENCOUNTER — HOSPITAL ENCOUNTER (OUTPATIENT)
Dept: HOSPITAL 47 - RADUSWWP | Age: 29
Discharge: HOME | End: 2019-03-27
Attending: OBSTETRICS & GYNECOLOGY
Payer: COMMERCIAL

## 2019-03-27 DIAGNOSIS — Z97.5: ICD-10-CM

## 2019-03-27 DIAGNOSIS — R10.2: Primary | ICD-10-CM

## 2019-03-27 PROCEDURE — 76856 US EXAM PELVIC COMPLETE: CPT

## 2019-03-27 NOTE — US
EXAMINATION TYPE: US pelvic complete

 

DATE OF EXAM: 3/27/2019

 

COMPARISON: NONE

 

CLINICAL HISTORY: 28-year-old female R10.2 PELVIC PAIN,  Z97.5 IUD PLACEMENT.

 

TECHNIQUE:  Transabdominal (TA).  

 

Date of LMP:  3/20/2019

 

FINDINGS:

 

EXAM MEASUREMENTS:

 

Uterus:  10.0 x 3.3x5.7   cm

Endometrial Stripe: 0.7 cm

Right Ovary:  3.9 x 1.7 x 3.0 cm

Left Ovary:  3.0 x 1.9 x 2.8 cm

 

 

 

1. Uterus:  Anteverted  

2. Endometrium:  wnl; IUD appears within endometrium

3. Right Ovary:  wnl

4. Left Ovary:  wnl

5. Bilateral Adnexa:  wnl

6. Posterior cul-de-sac:  no free fluid

 

SONOGRAPHER NOTES: IUD appears within place in endometrium, unremarkable study

 

 

 

IMPRESSION: 

1. IUD visualized appropriately situated within the uterine cavity.

2. No specific abnormality seen on transabdominal scanning.

## 2019-10-25 ENCOUNTER — HOSPITAL ENCOUNTER (EMERGENCY)
Dept: HOSPITAL 47 - EC | Age: 29
Discharge: HOME | End: 2019-10-25
Payer: COMMERCIAL

## 2019-10-25 VITALS — SYSTOLIC BLOOD PRESSURE: 107 MMHG | DIASTOLIC BLOOD PRESSURE: 76 MMHG | HEART RATE: 63 BPM | TEMPERATURE: 98.1 F

## 2019-10-25 VITALS — RESPIRATION RATE: 16 BRPM

## 2019-10-25 DIAGNOSIS — H53.149: ICD-10-CM

## 2019-10-25 DIAGNOSIS — R11.2: ICD-10-CM

## 2019-10-25 DIAGNOSIS — R51: Primary | ICD-10-CM

## 2019-10-25 LAB
ALBUMIN SERPL-MCNC: 4.5 G/DL (ref 3.5–5)
ALP SERPL-CCNC: 66 U/L (ref 38–126)
ALT SERPL-CCNC: 32 U/L (ref 9–52)
ANION GAP SERPL CALC-SCNC: 9 MMOL/L
APTT BLD: 26.7 SEC (ref 22–30)
AST SERPL-CCNC: 21 U/L (ref 14–36)
BASOPHILS # BLD AUTO: 0.1 K/UL (ref 0–0.2)
BASOPHILS NFR BLD AUTO: 1 %
BUN SERPL-SCNC: 12 MG/DL (ref 7–17)
CALCIUM SPEC-MCNC: 9.4 MG/DL (ref 8.4–10.2)
CHLORIDE SERPL-SCNC: 105 MMOL/L (ref 98–107)
CO2 SERPL-SCNC: 26 MMOL/L (ref 22–30)
EOSINOPHIL # BLD AUTO: 0.1 K/UL (ref 0–0.7)
EOSINOPHIL NFR BLD AUTO: 2 %
ERYTHROCYTE [DISTWIDTH] IN BLOOD BY AUTOMATED COUNT: 4.4 M/UL (ref 3.8–5.4)
ERYTHROCYTE [DISTWIDTH] IN BLOOD: 12.6 % (ref 11.5–15.5)
GLUCOSE SERPL-MCNC: 102 MG/DL (ref 74–99)
HCT VFR BLD AUTO: 40.2 % (ref 34–46)
HGB BLD-MCNC: 14.1 GM/DL (ref 11.4–16)
INR PPP: 1 (ref ?–1.2)
LYMPHOCYTES # SPEC AUTO: 1.4 K/UL (ref 1–4.8)
LYMPHOCYTES NFR SPEC AUTO: 18 %
MCH RBC QN AUTO: 32 PG (ref 25–35)
MCHC RBC AUTO-ENTMCNC: 35 G/DL (ref 31–37)
MCV RBC AUTO: 91.4 FL (ref 80–100)
MONOCYTES # BLD AUTO: 0.5 K/UL (ref 0–1)
MONOCYTES NFR BLD AUTO: 6 %
NEUTROPHILS # BLD AUTO: 5.6 K/UL (ref 1.3–7.7)
NEUTROPHILS NFR BLD AUTO: 72 %
PLATELET # BLD AUTO: 269 K/UL (ref 150–450)
POTASSIUM SERPL-SCNC: 3.8 MMOL/L (ref 3.5–5.1)
PROT SERPL-MCNC: 7.6 G/DL (ref 6.3–8.2)
PT BLD: 10.3 SEC (ref 9–12)
SODIUM SERPL-SCNC: 140 MMOL/L (ref 137–145)
WBC # BLD AUTO: 7.8 K/UL (ref 3.8–10.6)

## 2019-10-25 PROCEDURE — 85610 PROTHROMBIN TIME: CPT

## 2019-10-25 PROCEDURE — 80053 COMPREHEN METABOLIC PANEL: CPT

## 2019-10-25 PROCEDURE — 96374 THER/PROPH/DIAG INJ IV PUSH: CPT

## 2019-10-25 PROCEDURE — 36415 COLL VENOUS BLD VENIPUNCTURE: CPT

## 2019-10-25 PROCEDURE — 85730 THROMBOPLASTIN TIME PARTIAL: CPT

## 2019-10-25 PROCEDURE — 70496 CT ANGIOGRAPHY HEAD: CPT

## 2019-10-25 PROCEDURE — 85025 COMPLETE CBC W/AUTO DIFF WBC: CPT

## 2019-10-25 PROCEDURE — 99284 EMERGENCY DEPT VISIT MOD MDM: CPT

## 2019-10-25 NOTE — ED
General Adult HPI





- General


Chief complaint: Headache


Stated complaint: headache/nausea


Time Seen by Provider: 10/25/19 07:37


Source: patient, , RN notes reviewed


Mode of arrival: ambulatory


Limitations: no limitations





- History of Present Illness


Initial comments: 





Patient is a pleasant 29-year-old female presenting to the emergency department 

with another female who does help translate.  Patient complains of headache.  

Onset of headache was yesterday.  Onset was over around an hour.  Headache was 

more severe yesterday.  Headache has been persistent but improved slightly.  

Patient does have associated nausea and has vomiting.  Patient does have history

of headaches however this is been more severe than previous.  Patient had mild 

photophobia only with headlights visualized on the cars while presenting to the 

emergency department.  Headache is in the frontal and somewhat temporal as well 

as a posterior region.  No fevers.





- Related Data


                                  Previous Rx's











 Medication  Instructions  Recorded


 


Metoclopramide HCl [Reglan] 10 mg PO Q6HR PRN #15 tablet 10/25/19











                                    Allergies











Allergy/AdvReac Type Severity Reaction Status Date / Time


 


No Known Allergies Allergy   Verified 10/25/19 09:28














Review of Systems


ROS Statement: 


Those systems with pertinent positive or pertinent negative responses have been 

documented in the HPI.





ROS Other: All systems not noted in ROS Statement are negative.


Constitutional: Denies: fever


Eyes: Denies: eye pain


ENT: Denies: ear pain


Respiratory: Denies: cough


Cardiovascular: Denies: chest pain


Endocrine: Denies: fatigue


Gastrointestinal: Reports: nausea, vomiting.  Denies: abdominal pain


Genitourinary: Denies: urgency


Musculoskeletal: Denies: back pain


Skin: Denies: rash


Neurological: Reports: as per HPI, headache.  Denies: weakness, numbness, 

paresthesias, confusion





Past Medical History


Past Medical History: No Reported History


Additional Past Medical History / Comment(s): Obstetric history: First pregnancy

 was a vaginal delivery.  This is her second pregnancy and she had prenatal care

 with me since 13 weeks. A+, abs neg, Rub Imm, Treponemal ab neg, Hep B neg. she

 has gestational diabetes, diet-controlled.  NSTs have been reactive.  GBS neg.


History of Any Multi-Drug Resistant Organisms: None Reported


Past Surgical History: Cholecystectomy


Past Anesthesia/Blood Transfusion Reactions: No Reported Reaction


Past Psychological History: No Psychological Hx Reported


Smoking Status: Never smoker


Past Alcohol Use History: None Reported


Past Drug Use History: None Reported





- Past Family History


  ** Mother


Family Medical History: No Reported History





  ** Father


Family Medical History: Hypertension





General Exam


Limitations: no limitations


General appearance: alert, in no apparent distress


Head exam: Present: atraumatic, normocephalic


Eye exam: Present: normal appearance, PERRL, EOMI.  Absent: nystagmus


ENT exam: Present: normal oropharynx


Neck exam: Present: normal inspection


Respiratory exam: Present: normal lung sounds bilaterally


Cardiovascular Exam: Present: regular rate, normal rhythm


GI/Abdominal exam: Present: soft.  Absent: tenderness


Extremities exam: Present: normal inspection.  Absent: pedal edema, calf 

tenderness


Neurological exam: Present: alert, oriented X3, CN II-XII intact.  Absent: motor

 sensory deficit


  ** Expanded


Neurological exam: Present: protecting the airway


Speech: Present: fluid speech


Cranial nerves: EOM's Intact: Normal, Facial Sensation: Normal


Sensory exam: Upper Extremity Light Touch: Normal, Lower Extremity Light Touch: 

Normal


Motor strength exam: RUE: 5, LUE: 5, RLE: 5, LLE: 5


Eye Response: (4) open spontaneously


Motor Response: (6) obeys commands


Verbal Response: (5) oriented


Psychiatric exam: Present: normal affect, normal mood


Skin exam: Present: normal color





Course


                                   Vital Signs











  10/25/19 10/25/19





  07:26 09:51


 


Temperature 98.0 F 98.1 F


 


Pulse Rate 64 63


 


Respiratory 16 16





Rate  


 


Blood Pressure 114/72 107/76


 


O2 Sat by Pulse 99 98





Oximetry  














Medical Decision Making





- Medical Decision Making





Patient reevaluated and feels much better.  Patient updated on results and need 

for follow-up.





- Lab Data


Result diagrams: 


                                 10/25/19 08:07





                                 10/25/19 08:07


                                   Lab Results











  10/25/19 10/25/19 10/25/19 Range/Units





  08:07 08:07 08:07 


 


WBC  7.8    (3.8-10.6)  k/uL


 


RBC  4.40    (3.80-5.40)  m/uL


 


Hgb  14.1    (11.4-16.0)  gm/dL


 


Hct  40.2    (34.0-46.0)  %


 


MCV  91.4    (80.0-100.0)  fL


 


MCH  32.0    (25.0-35.0)  pg


 


MCHC  35.0    (31.0-37.0)  g/dL


 


RDW  12.6    (11.5-15.5)  %


 


Plt Count  269    (150-450)  k/uL


 


Neutrophils %  72    %


 


Lymphocytes %  18    %


 


Monocytes %  6    %


 


Eosinophils %  2    %


 


Basophils %  1    %


 


Neutrophils #  5.6    (1.3-7.7)  k/uL


 


Lymphocytes #  1.4    (1.0-4.8)  k/uL


 


Monocytes #  0.5    (0-1.0)  k/uL


 


Eosinophils #  0.1    (0-0.7)  k/uL


 


Basophils #  0.1    (0-0.2)  k/uL


 


PT    10.3  (9.0-12.0)  sec


 


INR    1.0  (<1.2)  


 


APTT    26.7  (22.0-30.0)  sec


 


Sodium   140   (137-145)  mmol/L


 


Potassium   3.8   (3.5-5.1)  mmol/L


 


Chloride   105   ()  mmol/L


 


Carbon Dioxide   26   (22-30)  mmol/L


 


Anion Gap   9   mmol/L


 


BUN   12   (7-17)  mg/dL


 


Creatinine   0.60   (0.52-1.04)  mg/dL


 


Est GFR (CKD-EPI)AfAm   >90   (>60 ml/min/1.73 sqM)  


 


Est GFR (CKD-EPI)NonAf   >90   (>60 ml/min/1.73 sqM)  


 


Glucose   102 H   (74-99)  mg/dL


 


Calcium   9.4   (8.4-10.2)  mg/dL


 


Total Bilirubin   0.7   (0.2-1.3)  mg/dL


 


AST   21   (14-36)  U/L


 


ALT   32   (9-52)  U/L


 


Alkaline Phosphatase   66   ()  U/L


 


Total Protein   7.6   (6.3-8.2)  g/dL


 


Albumin   4.5   (3.5-5.0)  g/dL














- Radiology Data


Radiology results: image reviewed (Computed tomography scan of the brain and CTA

 reveals no acute abnormality.)





Disposition


Clinical Impression: 


 Headache





Disposition: HOME SELF-CARE


Condition: Stable


Instructions (If sedation given, give patient instructions):  Acute Headache 

(ED)


Additional Instructions: 


Please follow-up with primary care physician in the next day or 2 for recheck.  

Return for increased pain, weakness, confusion, speech problems, worsening 

symptoms or other concerns.





Your prescription has been sent to [facility].


Prescriptions: 


Metoclopramide HCl [Reglan] 10 mg PO Q6HR PRN #15 tablet


 PRN Reason: Nausea


Is patient prescribed a controlled substance at d/c from ED?: No


Referrals: 


Margot Lock MD [STAFF PHYSICIAN] - 1-2 days


Time of Disposition: 10:20

## 2019-10-25 NOTE — CT
EXAMINATION TYPE: CT angio head

 

DATE OF EXAM: 10/25/2019

 

COMPARISON: None

 

HISTORY: Headache and nausea

 

CT DLP: 1847 mGycm

 

CONTRAST: 

CTA [place]r with 3-D reconstruction is performed and without and with IV Contrast, patient i
njected with 100 mL of Isovue 370.

 

Contrast CTA of the [place] of [place] was performed 3-D reconstruction imaging obtained at a separate 
workstation.  Vertebrobasilar system as well as intracranial portions of the internal carotid arterie
s and their major tributaries are patent.  I do not see evidence for sizable aneurysm or vascular mal
formation.  Please note MRI provides greater sensitivity and specificity.  Visualized brain appears g
rossly unremarkable. 

 

IMPRESSION: No evidence for sizable aneurysm or vascular malformation.

## 2023-10-13 ENCOUNTER — HOSPITAL ENCOUNTER (EMERGENCY)
Dept: HOSPITAL 47 - EC | Age: 33
Discharge: HOME | End: 2023-10-13
Payer: COMMERCIAL

## 2023-10-13 VITALS — HEART RATE: 74 BPM | DIASTOLIC BLOOD PRESSURE: 68 MMHG | SYSTOLIC BLOOD PRESSURE: 112 MMHG

## 2023-10-13 VITALS — RESPIRATION RATE: 16 BRPM

## 2023-10-13 VITALS — TEMPERATURE: 97.4 F

## 2023-10-13 DIAGNOSIS — R00.2: Primary | ICD-10-CM

## 2023-10-13 DIAGNOSIS — Z90.49: ICD-10-CM

## 2023-10-13 DIAGNOSIS — R51.9: ICD-10-CM

## 2023-10-13 LAB
ANION GAP SERPL CALC-SCNC: 12 MMOL/L
BASOPHILS # BLD AUTO: 0 K/UL (ref 0–0.2)
BASOPHILS NFR BLD AUTO: 0 %
BUN SERPL-SCNC: 13 MG/DL (ref 7–17)
CALCIUM SPEC-MCNC: 9.4 MG/DL (ref 8.4–10.2)
CHLORIDE SERPL-SCNC: 104 MMOL/L (ref 98–107)
CO2 SERPL-SCNC: 22 MMOL/L (ref 22–30)
EOSINOPHIL # BLD AUTO: 0 K/UL (ref 0–0.7)
EOSINOPHIL NFR BLD AUTO: 1 %
ERYTHROCYTE [DISTWIDTH] IN BLOOD BY AUTOMATED COUNT: 4.26 M/UL (ref 3.8–5.4)
ERYTHROCYTE [DISTWIDTH] IN BLOOD: 12.5 % (ref 11.5–15.5)
GLUCOSE SERPL-MCNC: 101 MG/DL (ref 74–99)
HCT VFR BLD AUTO: 39.3 % (ref 34–46)
HGB BLD-MCNC: 13.6 GM/DL (ref 11.4–16)
LYMPHOCYTES # SPEC AUTO: 1.3 K/UL (ref 1–4.8)
LYMPHOCYTES NFR SPEC AUTO: 19 %
MAGNESIUM SPEC-SCNC: 1.8 MG/DL (ref 1.6–2.3)
MCH RBC QN AUTO: 31.9 PG (ref 25–35)
MCHC RBC AUTO-ENTMCNC: 34.5 G/DL (ref 31–37)
MCV RBC AUTO: 92.3 FL (ref 80–100)
MONOCYTES # BLD AUTO: 0.3 K/UL (ref 0–1)
MONOCYTES NFR BLD AUTO: 5 %
NEUTROPHILS # BLD AUTO: 5 K/UL (ref 1.3–7.7)
NEUTROPHILS NFR BLD AUTO: 75 %
PLATELET # BLD AUTO: 233 K/UL (ref 150–450)
POTASSIUM SERPL-SCNC: 4.4 MMOL/L (ref 3.5–5.1)
SODIUM SERPL-SCNC: 138 MMOL/L (ref 137–145)
WBC # BLD AUTO: 6.7 K/UL (ref 3.8–10.6)

## 2023-10-13 PROCEDURE — 71045 X-RAY EXAM CHEST 1 VIEW: CPT

## 2023-10-13 PROCEDURE — 80048 BASIC METABOLIC PNL TOTAL CA: CPT

## 2023-10-13 PROCEDURE — 96375 TX/PRO/DX INJ NEW DRUG ADDON: CPT

## 2023-10-13 PROCEDURE — 83735 ASSAY OF MAGNESIUM: CPT

## 2023-10-13 PROCEDURE — 85025 COMPLETE CBC W/AUTO DIFF WBC: CPT

## 2023-10-13 PROCEDURE — 96374 THER/PROPH/DIAG INJ IV PUSH: CPT

## 2023-10-13 PROCEDURE — 70450 CT HEAD/BRAIN W/O DYE: CPT

## 2023-10-13 PROCEDURE — 96361 HYDRATE IV INFUSION ADD-ON: CPT

## 2023-10-13 PROCEDURE — 84484 ASSAY OF TROPONIN QUANT: CPT

## 2023-10-13 PROCEDURE — 99284 EMERGENCY DEPT VISIT MOD MDM: CPT

## 2023-10-13 PROCEDURE — 36415 COLL VENOUS BLD VENIPUNCTURE: CPT

## 2023-10-13 PROCEDURE — 93005 ELECTROCARDIOGRAM TRACING: CPT

## 2023-10-13 NOTE — CT
EXAMINATION TYPE: CT brain wo con

 

DATE OF EXAM: 10/13/2023

 

COMPARISON: 10/25/2019

 

HISTORY: 33-year-old female HA and dizziness

 

TECHNIQUE:  Examination was done in axial plane without intravenous contrast.  Coronal and sagittal r
econstructions performed.

 

CT DLP: 980.9 mGycm

Automated exposure control for dose reduction was used.

 

FINDINGS:

There is no evidence of  acute intracranial hemorrhage, acute ischemic changes, mass, mass-effect, or
 extra-axial fluid collection.  There is no effacement of cerebral sulci or basal subarachnoid cister
ns.  There is no hydrocephalus.  There is no midline shift.  Gray-white matter distinction is preserv
ed.

 

Partially empty sella incidentally noted, unchanged from prior.

 

Paranasal sinuses and mastoid air cells well pneumatized. Orbits and globes are intact.

 

 

IMPRESSION:

No acute intracranial abnormality seen.

## 2023-10-13 NOTE — ED
General Adult HPI





- General


Chief complaint: Dizziness


Stated complaint: Heart Palpitations, Dizziness


Time Seen by Provider: 10/13/23 07:15


Source: patient


Mode of arrival: ambulatory


Limitations: language barrier





- History of Present Illness


Initial comments: 


Dictation was produced using dragon dictation software. please excuse any 

grammatical, word or spelling errors. 











Chief Complaint: 33-year-old female presents emergency by with headache and 

palpitations





History of Present Illness: 33-year-old female presents emergency Department 

with headache and palpitations.  Patient states that she had a headache starting

at 6:00 AM this morning.  He doesn't have any medical history.  She has history 

of migraines however states that her headache is rather in a different location 

than from which is used to.  Denies any numbness and paresthesias.  Patient also

having palpitations.  She does not have any cardiac history.  Patient has a 

palpitations currently.  She does have some mild epigastric discomfort.  No 

nausea vomiting.  No fever or constitutional symptoms.








The ROS documented in this emergency department record has been reviewed and 

confirmed by me.  Those systems with pertinent positive or negative responses 

have been documented in the HPI.  All other systems are other negative and/or 

noncontributory.

















- Related Data


                                  Previous Rx's











 Medication  Instructions  Recorded


 


Metoclopramide HCl [Reglan] 10 mg PO Q6HR PRN #15 tablet 10/25/19











                                    Allergies











Allergy/AdvReac Type Severity Reaction Status Date / Time


 


No Known Allergies Allergy   Verified 10/13/23 07:17














Review of Systems


ROS Statement: 


Those systems with pertinent positive or pertinent negative responses have been 

documented in the HPI.





ROS Other: All systems not noted in ROS Statement are negative.





Past Medical History


Past Medical History: No Reported History


Additional Past Medical History / Comment(s): Obstetric history: First pregnancy

 was a vaginal delivery.  This is her second pregnancy and she had prenatal care

 with me since 13 weeks. A+, abs neg, Rub Imm, Treponemal ab neg, Hep B neg. she

 has gestational diabetes, diet-controlled.  NSTs have been reactive.  GBS neg.


History of Any Multi-Drug Resistant Organisms: None Reported


Past Surgical History: Cholecystectomy


Past Anesthesia/Blood Transfusion Reactions: No Reported Reaction


Past Psychological History: No Psychological Hx Reported


Smoking Status: Never smoker


Past Alcohol Use History: None Reported


Past Drug Use History: None Reported





- Past Family History


  ** Mother


Family Medical History: No Reported History





  ** Father


Family Medical History: Hypertension





General Exam





- General Exam Comments


Initial Comments: 








PHYSICAL EXAM:


General Impression: Alert and oriented x3, not in acute distress


HEENT: Normocephalic atraumatic, extra-ocular movements intact, pupils equal and

 reactive to light bilaterally, mucous membranes moist.


Cardiovascular: Heart regular rate and rhythm


Chest: Able to complete full sentences, no retractions, no tachypnea


Abdomen: abdomen soft, non-tender, non-distended, no organomegaly


Musculoskeletal: Pulses present and equal in all extremities, no peripheral 

edema


Motor:  no focal deficits noted


Neurological: CN II-XII grossly intact, no focal motor or sensory deficits noted


Skin: Intact with no visualized rashes


Psych: Normal affect and mood











Limitations: language barrier





Course


                                   Vital Signs











  10/13/23 10/13/23





  07:14 08:24


 


Temperature 97.4 F L 


 


Pulse Rate 69 83


 


Respiratory 20 16





Rate  


 


Blood Pressure 134/80 121/76


 


O2 Sat by Pulse 100 100





Oximetry  














EKG Findings





- EKG Comments:


EKG Findings:: My EKG interpretation: Ventricular rate 65, sinus rhythm,.  

Interval 199, QRS 92, QTc 435. No IL prolongation, no QTC prolongation, no ST or

 T-wave changes noted.   Overall, this EKG is unremarkable





Medical Decision Making





- Medical Decision Making


Was pt. sent in by a medical professional or institution (JENNIFER Parry, NP, urgent 

care, hospital, or nursing home...) When possible be specific


@  -No


Did you speak to anyone other than the patient for history (EMS, parent, family,

 police, friend...)? What history was obtained from this source 


@  -No


Did you review nursing and triage notes (agree or disagree)?  Why? 


@  -I reviewed and agree with nursing and triage notes


Were old charts reviewed (outside hosp., previous admission, EMS record, old 

EKG, old radiological studies, urgent care reports/EKG's, nursing home records)?

 Report findings 


@  -No old charts were reviewed


Differential Diagnosis (chest pain, altered mental status, abdominal pain women,

 abdominal pain men, vaginal bleeding, musculoskeletal, weakness, fever, 

dyspnea, syncope, headache, dizziness, GI bleed, back pain, seizure, CVA, 

palpatations, mental health)? 


@  -Differential Headache:


Migraine, tension, cluster, carbon monoxide, central venous thrombosis, pension 

karma temporal arteritis, acute closure glaucoma, intercranial hemorrhage, 

mastoiditis, sinusitis, head injury, this is not meant to be an all-inclusive 

list.





Differential Palpitations:


Ventricular arrhythmias, atrial arrhythmias, myocardial infarction, anemia, 

thyrotoxicosis, electrolyte imbalance, hypokalemia, pulmonary embolism, 

pulmonary disease, drugs, alcohol, anxiety, stress....


This is not meant to be an all-inclusive list.





EKG interpreted by me (3pts min.).


@  -See above


X-rays interpreted by me (1pt min.).


@  -None done


CT interpreted by me (1pt min.).


@  -Scan of brain is unremarkable


U/S interpreted by me (1pt. min.).


@  -None done


What testing was considered but not performed or refused? (CT, X-rays, U/S, 

labs)? Why?


@  -None


What meds were considered but not given or refused? Why?


@  -None


Did you discuss the management of the patient with other professionals 

(professionals i.e. , PA, NP, lab, RT, psych nurse, , , 

teacher, , )? Give summary


@  -No


Was smoking cessation discussed for >3mins.?


@  -No


Was critical care preformed (if so, how long)?


@  -No


Were there social determinants of health that impacted care today? How? (

Homelessness, low income, unemployed, alcoholism, drug addiction, 

transportation, low edu. Level, literacy, decrease access to med. care, alf, 

rehab)?


@  -No


Was there de-escalation of care discussed even if they declined (Discuss DNR or 

withdrawal of care, Hospice)? DNR status


@  -No


What co-morbidities impacted this encounter? (DM, HTN, Smoking, COPD, CAD, 

Cancer, CVA, ARF, Chemo, Hep., AIDS, mental health diagnosis, sleep apnea, 

morbid obesity)?


@  -None


Was patient admitted / discharged? Hospital course, mention meds given and 

route, prescriptions, significant lab abnormalities, going to OR and other 

pertinent info.


@  -Year-old well-appearing female with no significant past comorbidities 

presents to the ER for headache and palpitations.  Vital signs stable.  EKG is 

unremarkable.  Labs are normal.  Patient reevaluated at bedside at 9:48 AM with 

improvement of symptoms after headache cocktail.  Imaging studies are negative. 

 Patient discharged told to follow-up with primary care doctor.


Undiagnosed new problem with uncertain prognosis?


@  -No


Drug Therapy requiring intensive monitoring for toxicity (Heparin, Nitro, 

Insulin, Cardizem)?


@  -No


Were any procedures done?


@  -No


Diagnosis/symptom?  Acute, or Chronic, or Acute on Chronic?  Uncomplicated 

(without systemic symptoms) or Complicated (systemic symptoms)?


@  -1. Headache, 2.  Palpitations


Side effects of treatment?


@  -No


Exacerbation, Progression, or Severe Exacerbation?


@  -No


Poses a threat to life or bodily function? How? (Chest pain, USA, MI, pneumonia,

 PE, COPD, DKA, ARF, appy, cholecystitis, CVA, Diverticulitis, Homicidal, 

Suicidal, threat to staff... and all critical care pts)


@  -No





- Lab Data


Result diagrams: 


                                 10/13/23 08:10





                                 10/13/23 08:10


                                   Lab Results











  10/13/23 10/13/23 10/13/23 Range/Units





  08:10 08:10 08:10 


 


WBC  6.7    (3.8-10.6)  k/uL


 


RBC  4.26    (3.80-5.40)  m/uL


 


Hgb  13.6    (11.4-16.0)  gm/dL


 


Hct  39.3    (34.0-46.0)  %


 


MCV  92.3    (80.0-100.0)  fL


 


MCH  31.9    (25.0-35.0)  pg


 


MCHC  34.5    (31.0-37.0)  g/dL


 


RDW  12.5    (11.5-15.5)  %


 


Plt Count  233    (150-450)  k/uL


 


MPV  7.9    


 


Neutrophils %  75    %


 


Lymphocytes %  19    %


 


Monocytes %  5    %


 


Eosinophils %  1    %


 


Basophils %  0    %


 


Neutrophils #  5.0    (1.3-7.7)  k/uL


 


Lymphocytes #  1.3    (1.0-4.8)  k/uL


 


Monocytes #  0.3    (0-1.0)  k/uL


 


Eosinophils #  0.0    (0-0.7)  k/uL


 


Basophils #  0.0    (0-0.2)  k/uL


 


Sodium   138   (137-145)  mmol/L


 


Potassium   4.4   (3.5-5.1)  mmol/L


 


Chloride   104   ()  mmol/L


 


Carbon Dioxide   22   (22-30)  mmol/L


 


Anion Gap   12   mmol/L


 


BUN   13   (7-17)  mg/dL


 


Creatinine   0.45 L   (0.52-1.04)  mg/dL


 


Est GFR (CKD-EPI)AfAm   >90   (>60 ml/min/1.73 sqM)  


 


Est GFR (CKD-EPI)NonAf   >90   (>60 ml/min/1.73 sqM)  


 


Glucose   101 H   (74-99)  mg/dL


 


Calcium   9.4   (8.4-10.2)  mg/dL


 


Magnesium   1.8   (1.6-2.3)  mg/dL


 


Troponin I    <0.012  (0.000-0.034)  ng/mL














Disposition


Clinical Impression: 


 Headache, Palpitations





Disposition: HOME SELF-CARE


Condition: Good


Instructions (If sedation given, give patient instructions):  Acute Headache 

(ED), Heart Palpitations (ED)


Is patient prescribed a controlled substance at d/c from ED?: No


Referrals: 


Dung Chaudhari MD [Primary Care Provider] - 1-2 days


Time of Disposition: 09:49

## 2023-10-13 NOTE — XR
EXAMINATION TYPE: XR chest 1V portable

 

DATE OF EXAM: 10/13/2023

 

COMPARISON: 3/17/2019

 

HISTORY: Chest pain

 

TECHNIQUE: Single frontal view of the chest is obtained.

 

FINDINGS:  

There is no focal air space opacity, pleural effusion, or pneumothorax seen.  

The cardiac silhouette size is within normal limits.   

The osseous structures are intact.

 

IMPRESSION:  

1.  No acute process.